# Patient Record
Sex: MALE | Race: BLACK OR AFRICAN AMERICAN | Employment: FULL TIME | URBAN - METROPOLITAN AREA
[De-identification: names, ages, dates, MRNs, and addresses within clinical notes are randomized per-mention and may not be internally consistent; named-entity substitution may affect disease eponyms.]

---

## 2024-06-15 ENCOUNTER — HOSPITAL ENCOUNTER (EMERGENCY)
Facility: HOSPITAL | Age: 19
Discharge: HOME | End: 2024-06-15
Attending: EMERGENCY MEDICINE
Payer: MEDICAID

## 2024-06-15 VITALS
SYSTOLIC BLOOD PRESSURE: 130 MMHG | HEIGHT: 63 IN | WEIGHT: 125 LBS | HEART RATE: 95 BPM | RESPIRATION RATE: 20 BRPM | TEMPERATURE: 100.2 F | OXYGEN SATURATION: 97 % | BODY MASS INDEX: 22.15 KG/M2 | DIASTOLIC BLOOD PRESSURE: 75 MMHG

## 2024-06-15 DIAGNOSIS — J45.901 SEVERE ASTHMA WITH ACUTE EXACERBATION, UNSPECIFIED WHETHER PERSISTENT: ICD-10-CM

## 2024-06-15 DIAGNOSIS — J18.9 ATYPICAL PNEUMONIA: ICD-10-CM

## 2024-06-15 DIAGNOSIS — J20.9 ACUTE BRONCHITIS, UNSPECIFIED ORGANISM: Primary | ICD-10-CM

## 2024-06-15 LAB
DEPRECATED S PYO AG THROAT QL EIA: NEGATIVE
FLUAV RNA SPEC QL NAA+PROBE: NOT DETECTED
FLUBV RNA SPEC QL NAA+PROBE: NOT DETECTED
SARS-COV-2 RNA RESP QL NAA+PROBE: NOT DETECTED

## 2024-06-15 PROCEDURE — 87880 STREP A ASSAY W/OPTIC: CPT

## 2024-06-15 PROCEDURE — 87636 SARSCOV2 & INF A&B AMP PRB: CPT

## 2024-06-15 PROCEDURE — 6370000000 HC RX 637 (ALT 250 FOR IP): Performed by: EMERGENCY MEDICINE

## 2024-06-15 PROCEDURE — 87070 CULTURE OTHR SPECIMN AEROBIC: CPT

## 2024-06-15 PROCEDURE — 99283 EMERGENCY DEPT VISIT LOW MDM: CPT

## 2024-06-15 RX ORDER — PREDNISONE 10 MG/1
TABLET ORAL
Qty: 1 EACH | Refills: 0 | Status: SHIPPED | OUTPATIENT
Start: 2024-06-15

## 2024-06-15 RX ORDER — CODEINE PHOSPHATE AND GUAIFENESIN 10; 100 MG/5ML; MG/5ML
10 SOLUTION ORAL
Status: COMPLETED | OUTPATIENT
Start: 2024-06-15 | End: 2024-06-15

## 2024-06-15 RX ORDER — PREDNISONE 20 MG/1
60 TABLET ORAL
Status: COMPLETED | OUTPATIENT
Start: 2024-06-15 | End: 2024-06-15

## 2024-06-15 RX ORDER — DOXYCYCLINE HYCLATE 100 MG
100 TABLET ORAL 2 TIMES DAILY
Qty: 20 TABLET | Refills: 0 | Status: SHIPPED | OUTPATIENT
Start: 2024-06-15 | End: 2024-06-25

## 2024-06-15 RX ORDER — IPRATROPIUM BROMIDE AND ALBUTEROL SULFATE 2.5; .5 MG/3ML; MG/3ML
1 SOLUTION RESPIRATORY (INHALATION) EVERY 4 HOURS PRN
Qty: 90 ML | Refills: 0 | Status: SHIPPED | OUTPATIENT
Start: 2024-06-15 | End: 2024-06-20

## 2024-06-15 RX ORDER — IBUPROFEN 600 MG/1
600 TABLET ORAL 4 TIMES DAILY PRN
Qty: 40 TABLET | Refills: 0 | Status: SHIPPED | OUTPATIENT
Start: 2024-06-15

## 2024-06-15 RX ORDER — ALBUTEROL SULFATE 90 UG/1
2 AEROSOL, METERED RESPIRATORY (INHALATION)
Status: COMPLETED | OUTPATIENT
Start: 2024-06-15 | End: 2024-06-15

## 2024-06-15 RX ORDER — BENZONATATE 200 MG/1
200 CAPSULE ORAL 3 TIMES DAILY PRN
Qty: 21 CAPSULE | Refills: 0 | Status: SHIPPED | OUTPATIENT
Start: 2024-06-15 | End: 2024-06-22

## 2024-06-15 RX ORDER — ACETAMINOPHEN 500 MG
1000 TABLET ORAL
Status: COMPLETED | OUTPATIENT
Start: 2024-06-15 | End: 2024-06-15

## 2024-06-15 RX ORDER — ALBUTEROL SULFATE 90 UG/1
2 AEROSOL, METERED RESPIRATORY (INHALATION) 4 TIMES DAILY PRN
Qty: 54 G | Refills: 2 | Status: SHIPPED | OUTPATIENT
Start: 2024-06-15

## 2024-06-15 RX ORDER — IBUPROFEN 400 MG/1
800 TABLET ORAL
Status: COMPLETED | OUTPATIENT
Start: 2024-06-15 | End: 2024-06-15

## 2024-06-15 RX ORDER — ACETAMINOPHEN 500 MG
1000 TABLET ORAL 3 TIMES DAILY PRN
Qty: 30 TABLET | Refills: 0 | Status: SHIPPED | OUTPATIENT
Start: 2024-06-15 | End: 2024-06-20

## 2024-06-15 RX ORDER — DOXYCYCLINE HYCLATE 100 MG
100 TABLET ORAL EVERY 12 HOURS SCHEDULED
Status: DISCONTINUED | OUTPATIENT
Start: 2024-06-15 | End: 2024-06-15 | Stop reason: HOSPADM

## 2024-06-15 RX ADMIN — ACETAMINOPHEN 1000 MG: 500 TABLET ORAL at 03:51

## 2024-06-15 RX ADMIN — ALBUTEROL SULFATE 2 PUFF: 90 AEROSOL, METERED RESPIRATORY (INHALATION) at 04:07

## 2024-06-15 RX ADMIN — GUAIFENESIN AND CODEINE PHOSPHATE 10 ML: 100; 10 SOLUTION ORAL at 04:07

## 2024-06-15 RX ADMIN — IBUPROFEN 800 MG: 400 TABLET, FILM COATED ORAL at 03:51

## 2024-06-15 RX ADMIN — PREDNISONE 60 MG: 20 TABLET ORAL at 04:07

## 2024-06-15 ASSESSMENT — ENCOUNTER SYMPTOMS
COLOR CHANGE: 0
VOMITING: 0
ABDOMINAL PAIN: 0
WHEEZING: 1
SHORTNESS OF BREATH: 1
TROUBLE SWALLOWING: 0
COUGH: 1
SORE THROAT: 0
DIARRHEA: 0
BACK PAIN: 0
PHOTOPHOBIA: 0
NAUSEA: 0

## 2024-06-15 ASSESSMENT — PAIN DESCRIPTION - DESCRIPTORS: DESCRIPTORS: ACHING

## 2024-06-15 ASSESSMENT — PAIN - FUNCTIONAL ASSESSMENT: PAIN_FUNCTIONAL_ASSESSMENT: 0-10

## 2024-06-15 ASSESSMENT — PAIN DESCRIPTION - LOCATION: LOCATION: GENERALIZED

## 2024-06-15 ASSESSMENT — PAIN SCALES - GENERAL: PAINLEVEL_OUTOF10: 10

## 2024-06-15 ASSESSMENT — LIFESTYLE VARIABLES
HOW OFTEN DO YOU HAVE A DRINK CONTAINING ALCOHOL: NEVER
HOW MANY STANDARD DRINKS CONTAINING ALCOHOL DO YOU HAVE ON A TYPICAL DAY: PATIENT DOES NOT DRINK

## 2024-06-15 NOTE — ED NOTES
Refer to triage note.   Pt is alert and oriented x 4, RR even and unlabored, skin  intact. Assessment completed and pt updated on plan of care.  Call bell in reach.       Emergency Department Nursing Plan of Care       The Nursing Plan of Care is developed from the Nursing assessment and Emergency Department Attending provider initial evaluation.  The plan of care may be reviewed in the “ED Provider note”.    The Plan of Care was developed with the following considerations:   Patient / Family readiness to learn indicated by:verbalized understanding  Persons(s) to be included in education: patient  Barriers to Learning/Limitations:No    Signed

## 2024-06-15 NOTE — ED NOTES
Discharge instructions were given to the patient by Perez.     The patient left the Emergency Department alert and oriented and in no acute distress with 7 prescriptions. The patient was encouraged to call or return to the ED for worsening issues or problems and was encouraged to schedule a follow up appointment for continuing care.     Ambulation assessment completed before discharge.  Pt left Emergency Department ambulating at baseline with no ortho devices  Ortho device education: none    The patient verbalized understanding of discharge instructions and prescriptions, all questions were answered. The patient has no further concerns at this time.

## 2024-06-15 NOTE — ED TRIAGE NOTES
Pt ambulatory to ED complaining of 3-4 days of productive cough, fevers, and sore throat. Seen at [facility] for same, given albuterol without relief. Hx of asthma, speaking in full sentences without distress.

## 2024-06-15 NOTE — DISCHARGE INSTRUCTIONS
It was a pleasure taking care of you in our Emergency Department today.  We know that when you come to [facility], you are entrusting us with your health, comfort, and safety.  Our physicians and nurses honor that trust, and truly appreciate the opportunity to care for you and your loved ones.    We also value your feedback.  If you receive a survey about your Emergency Department experience today, please fill it out.  We care about our patients' feedback, and we listen to what you have to say.  Thank you!      Dr. Gail Solis MD

## 2024-06-15 NOTE — ED PROVIDER NOTES
[facility] EMERGENCY DEPT  EMERGENCY DEPARTMENT ENCOUNTER         Pt Name: Jun Andujar  MRN: [id number]  Birthdate 2005  Date of evaluation: 6/15/2024  Provider: Gail Solis MD   PCP: No primary care provider on file.  Note Started: 5:20 AM 6/15/24     CHIEF COMPLAINT       Chief Complaint   Patient presents with    Cough    Fever        HISTORY OF PRESENT ILLNESS: 1 or more elements      History From: Patient and Patient's Mother  HPI Limitations: None     Jun Andujar is a 18 y.o. male who presents ***  History of asthma  For the past 5 days has had fever, chills, productive cough, pleurisy  Went to [facility] twice, was prescribed oral steroids, bronchodilators and mucinex.  Not helping and cough getting worse.  Dx was \"walking pna\"      Please see more comprehensive history below under MDM  Nursing Notes were all reviewed in real time as they are made available. Any disagreements addressed in the HPI/MDM.     REVIEW OF SYSTEMS      Review of Systems   Constitutional:  Positive for appetite change, chills and fever.   HENT:  Positive for congestion. Negative for ear pain, sore throat and trouble swallowing.    Eyes:  Negative for photophobia and visual disturbance.   Respiratory:  Positive for cough, shortness of breath and wheezing.    Cardiovascular:  Negative for chest pain, palpitations and leg swelling.   Gastrointestinal:  Negative for abdominal pain, diarrhea, nausea and vomiting.   Genitourinary:  Negative for dysuria, flank pain and hematuria.   Musculoskeletal:  Negative for arthralgias and back pain.   Skin:  Negative for color change and pallor.   Allergic/Immunologic: Negative for immunocompromised state.   Neurological:  Negative for syncope, light-headedness and headaches.   Psychiatric/Behavioral:  Negative for confusion.         Positives and Pertinent negatives as per HPI and MDM.    PAST HISTORY     Past Medical History:  No past medical history on file.    Past Surgical History:  No past surgical  times daily as needed for Pain or Fever     albuterol sulfate  (90 Base) MCG/ACT inhaler  Commonly known as: Ventolin HFA  Inhale 2 puffs into the lungs 4 times daily as needed for Wheezing     benzonatate 200 MG capsule  Commonly known as: TESSALON  Take 1 capsule by mouth 3 times daily as needed for Cough     doxycycline hyclate 100 MG tablet  Commonly known as: VIBRA-TABS  Take 1 tablet by mouth 2 times daily for 10 days     ibuprofen 600 MG tablet  Commonly known as: ADVIL;MOTRIN  Take 1 tablet by mouth 4 times daily as needed for Fever or Pain     ipratropium 0.5 mg-albuterol 2.5 mg 0.5-2.5 (3) MG/3ML Soln nebulizer solution  Commonly known as: DUONEB  Take 3 mLs by nebulization every 4 hours as needed for Shortness of Breath or Wheezing     predniSONE 10 MG (21) Tbpk  Use as directed               Where to Get Your Medications        These medications were sent to [facility]/pharmacy #[id number] - [place], [place] - [address] [phone] - F [phone]  [address] [address]      Hours: 24-hours Phone: [phone]   acetaminophen 500 MG tablet  albuterol sulfate  (90 Base) MCG/ACT inhaler  benzonatate 200 MG capsule  doxycycline hyclate 100 MG tablet  ibuprofen 600 MG tablet  ipratropium 0.5 mg-albuterol 2.5 mg 0.5-2.5 (3) MG/3ML Soln nebulizer solution  predniSONE 10 MG (21) Tbpk       2.   [facility] EMERGENCY DEPT  [address] [facility] [address] [phone]  Go to   As needed, If symptoms worsen    [facility]  [address] [id number] [phone]  Schedule an appointment as soon as possible for a visit       3.   Return to ED if worse       I am the Primary Clinician of Record.   Gail Solis MD (electronically signed)    (Please note that parts of this dictation were completed with voice recognition software. Quite often unanticipated grammatical, syntax, homophones, and other interpretive errors are inadvertently transcribed by the

## 2024-06-16 LAB
BACTERIA SPEC CULT: NORMAL
SERVICE CMNT-IMP: NORMAL

## 2024-06-17 LAB
BACTERIA SPEC CULT: NORMAL
SERVICE CMNT-IMP: NORMAL

## 2024-06-21 ENCOUNTER — APPOINTMENT (OUTPATIENT)
Facility: HOSPITAL | Age: 19
End: 2024-06-21
Payer: MEDICAID

## 2024-06-21 ENCOUNTER — HOSPITAL ENCOUNTER (INPATIENT)
Facility: HOSPITAL | Age: 19
LOS: 7 days | Discharge: HOME | End: 2024-06-28
Attending: INTERNAL MEDICINE | Admitting: FAMILY MEDICINE
Payer: MEDICAID

## 2024-06-21 ENCOUNTER — HOSPITAL ENCOUNTER (EMERGENCY)
Facility: HOSPITAL | Age: 19
Discharge: ANOTHER HOSPITAL | End: 2024-06-21
Payer: MEDICAID

## 2024-06-21 VITALS
DIASTOLIC BLOOD PRESSURE: 61 MMHG | RESPIRATION RATE: 16 BRPM | OXYGEN SATURATION: 98 % | WEIGHT: 114.42 LBS | TEMPERATURE: 98.3 F | HEART RATE: 100 BPM | SYSTOLIC BLOOD PRESSURE: 134 MMHG | BODY MASS INDEX: 20.27 KG/M2 | HEIGHT: 63 IN

## 2024-06-21 DIAGNOSIS — D72.829 LEUKOCYTOSIS, UNSPECIFIED TYPE: ICD-10-CM

## 2024-06-21 DIAGNOSIS — J18.9 PNEUMONIA OF LEFT LOWER LOBE DUE TO INFECTIOUS ORGANISM: Primary | ICD-10-CM

## 2024-06-21 DIAGNOSIS — J98.4 CAVITARY LESION OF LUNG: ICD-10-CM

## 2024-06-21 DIAGNOSIS — J85.1 ABSCESS OF LUNG WITH PNEUMONIA, UNSPECIFIED LATERALITY (HCC): Primary | ICD-10-CM

## 2024-06-21 LAB
ALBUMIN SERPL-MCNC: 3 G/DL (ref 3.5–5)
ALBUMIN/GLOB SERPL: 0.7 (ref 1.1–2.2)
ALP SERPL-CCNC: 62 U/L (ref 45–117)
ALT SERPL-CCNC: 50 U/L (ref 12–78)
ANION GAP SERPL CALC-SCNC: 8 MMOL/L (ref 5–15)
APPEARANCE UR: CLEAR
AST SERPL-CCNC: 25 U/L (ref 15–37)
BACTERIA URNS QL MICRO: NEGATIVE /HPF
BASOPHILS # BLD: 0.3 K/UL (ref 0–0.1)
BASOPHILS NFR BLD: 1 % (ref 0–1)
BILIRUB SERPL-MCNC: 0.6 MG/DL (ref 0.2–1)
BILIRUB UR QL: NEGATIVE
BUN SERPL-MCNC: 5 MG/DL (ref 6–20)
BUN/CREAT SERPL: 5 (ref 12–20)
CALCIUM SERPL-MCNC: 8.8 MG/DL (ref 8.5–10.1)
CHLORIDE SERPL-SCNC: 100 MMOL/L (ref 97–108)
CO2 SERPL-SCNC: 33 MMOL/L (ref 21–32)
COLOR UR: ABNORMAL
CREAT SERPL-MCNC: 1.02 MG/DL (ref 0.7–1.3)
DEPRECATED S PYO AG THROAT QL EIA: NEGATIVE
DIFFERENTIAL METHOD BLD: ABNORMAL
EOSINOPHIL # BLD: 0 K/UL (ref 0–0.4)
EOSINOPHIL NFR BLD: 0 % (ref 0–7)
EPITH CASTS URNS QL MICRO: ABNORMAL /LPF
ERYTHROCYTE [DISTWIDTH] IN BLOOD BY AUTOMATED COUNT: 12.6 % (ref 11.5–14.5)
GLOBULIN SER CALC-MCNC: 4.6 G/DL (ref 2–4)
GLUCOSE SERPL-MCNC: 67 MG/DL (ref 65–100)
GLUCOSE UR STRIP.AUTO-MCNC: 500 MG/DL
HCT VFR BLD AUTO: 47.2 % (ref 36.6–50.3)
HGB BLD-MCNC: 16.2 G/DL (ref 12.1–17)
HGB UR QL STRIP: NEGATIVE
IMM GRANULOCYTES # BLD AUTO: 0.3 K/UL (ref 0–0.04)
IMM GRANULOCYTES NFR BLD AUTO: 1 % (ref 0–0.5)
KETONES UR QL STRIP.AUTO: NEGATIVE MG/DL
LACTATE BLD-SCNC: 1.08 MMOL/L (ref 0.4–2)
LEUKOCYTE ESTERASE UR QL STRIP.AUTO: NEGATIVE
LYMPHOCYTES # BLD: 2.9 K/UL (ref 0.8–3.5)
LYMPHOCYTES NFR BLD: 11 % (ref 12–49)
MCH RBC QN AUTO: 31.4 PG (ref 26–34)
MCHC RBC AUTO-ENTMCNC: 34.3 G/DL (ref 30–36.5)
MCV RBC AUTO: 91.5 FL (ref 80–99)
MONOCYTES # BLD: 1.3 K/UL (ref 0–1)
MONOCYTES NFR BLD: 5 % (ref 5–13)
NEUTS SEG # BLD: 21.3 K/UL (ref 1.8–8)
NEUTS SEG NFR BLD: 82 % (ref 32–75)
NITRITE UR QL STRIP.AUTO: NEGATIVE
NRBC # BLD: 0 K/UL (ref 0–0.01)
NRBC BLD-RTO: 0 PER 100 WBC
PH UR STRIP: 7 (ref 5–8)
PLATELET # BLD AUTO: 369 K/UL (ref 150–400)
PMV BLD AUTO: 9.8 FL (ref 8.9–12.9)
POTASSIUM SERPL-SCNC: 4 MMOL/L (ref 3.5–5.1)
PROCALCITONIN SERPL-MCNC: 0.12 NG/ML
PROT SERPL-MCNC: 7.6 G/DL (ref 6.4–8.2)
PROT UR STRIP-MCNC: NEGATIVE MG/DL
RBC # BLD AUTO: 5.16 M/UL (ref 4.1–5.7)
RBC #/AREA URNS HPF: ABNORMAL /HPF (ref 0–5)
RBC MORPH BLD: ABNORMAL
SODIUM SERPL-SCNC: 141 MMOL/L (ref 136–145)
SP GR UR REFRACTOMETRY: 1.01
TROPONIN I SERPL HS-MCNC: 6 NG/L (ref 0–76)
URINE CULTURE IF INDICATED: ABNORMAL
UROBILINOGEN UR QL STRIP.AUTO: 1 EU/DL (ref 0.2–1)
WBC # BLD AUTO: 26.1 K/UL (ref 4.1–11.1)
WBC URNS QL MICRO: ABNORMAL /HPF (ref 0–4)

## 2024-06-21 PROCEDURE — 87040 BLOOD CULTURE FOR BACTERIA: CPT

## 2024-06-21 PROCEDURE — 80053 COMPREHEN METABOLIC PANEL: CPT

## 2024-06-21 PROCEDURE — 87880 STREP A ASSAY W/OPTIC: CPT

## 2024-06-21 PROCEDURE — 36415 COLL VENOUS BLD VENIPUNCTURE: CPT

## 2024-06-21 PROCEDURE — 6370000000 HC RX 637 (ALT 250 FOR IP): Performed by: FAMILY MEDICINE

## 2024-06-21 PROCEDURE — 87449 NOS EACH ORGANISM AG IA: CPT

## 2024-06-21 PROCEDURE — 87070 CULTURE OTHR SPECIMN AEROBIC: CPT

## 2024-06-21 PROCEDURE — 2580000003 HC RX 258: Performed by: EMERGENCY MEDICINE

## 2024-06-21 PROCEDURE — 83605 ASSAY OF LACTIC ACID: CPT

## 2024-06-21 PROCEDURE — 96365 THER/PROPH/DIAG IV INF INIT: CPT

## 2024-06-21 PROCEDURE — 85025 COMPLETE CBC W/AUTO DIFF WBC: CPT

## 2024-06-21 PROCEDURE — 96368 THER/DIAG CONCURRENT INF: CPT

## 2024-06-21 PROCEDURE — 2580000003 HC RX 258: Performed by: FAMILY MEDICINE

## 2024-06-21 PROCEDURE — 96366 THER/PROPH/DIAG IV INF ADDON: CPT

## 2024-06-21 PROCEDURE — 99285 EMERGENCY DEPT VISIT HI MDM: CPT

## 2024-06-21 PROCEDURE — 0202U NFCT DS 22 TRGT SARS-COV-2: CPT

## 2024-06-21 PROCEDURE — 2580000003 HC RX 258: Performed by: NURSE PRACTITIONER

## 2024-06-21 PROCEDURE — 6360000002 HC RX W HCPCS: Performed by: EMERGENCY MEDICINE

## 2024-06-21 PROCEDURE — 84145 PROCALCITONIN (PCT): CPT

## 2024-06-21 PROCEDURE — 84484 ASSAY OF TROPONIN QUANT: CPT

## 2024-06-21 PROCEDURE — 71045 X-RAY EXAM CHEST 1 VIEW: CPT

## 2024-06-21 PROCEDURE — 1100000000 HC RM PRIVATE

## 2024-06-21 PROCEDURE — 81001 URINALYSIS AUTO W/SCOPE: CPT

## 2024-06-21 RX ORDER — ACETAMINOPHEN 325 MG/1
650 TABLET ORAL EVERY 6 HOURS PRN
Status: DISCONTINUED | OUTPATIENT
Start: 2024-06-21 | End: 2024-06-28 | Stop reason: HOSPADM

## 2024-06-21 RX ORDER — BENZONATATE 100 MG/1
100 CAPSULE ORAL 3 TIMES DAILY PRN
Status: DISCONTINUED | OUTPATIENT
Start: 2024-06-21 | End: 2024-06-28 | Stop reason: HOSPADM

## 2024-06-21 RX ORDER — GUAIFENESIN 600 MG/1
600 TABLET, EXTENDED RELEASE ORAL 2 TIMES DAILY
Status: DISCONTINUED | OUTPATIENT
Start: 2024-06-21 | End: 2024-06-28 | Stop reason: HOSPADM

## 2024-06-21 RX ORDER — LEVOFLOXACIN 5 MG/ML
750 INJECTION, SOLUTION INTRAVENOUS ONCE
Status: COMPLETED | OUTPATIENT
Start: 2024-06-21 | End: 2024-06-21

## 2024-06-21 RX ORDER — ONDANSETRON 4 MG/1
4 TABLET, ORALLY DISINTEGRATING ORAL EVERY 8 HOURS PRN
Status: DISCONTINUED | OUTPATIENT
Start: 2024-06-21 | End: 2024-06-28 | Stop reason: HOSPADM

## 2024-06-21 RX ORDER — SODIUM CHLORIDE 0.9 % (FLUSH) 0.9 %
5-40 SYRINGE (ML) INJECTION EVERY 12 HOURS SCHEDULED
Status: DISCONTINUED | OUTPATIENT
Start: 2024-06-21 | End: 2024-06-28 | Stop reason: HOSPADM

## 2024-06-21 RX ORDER — SODIUM CHLORIDE 9 MG/ML
INJECTION, SOLUTION INTRAVENOUS PRN
Status: DISCONTINUED | OUTPATIENT
Start: 2024-06-21 | End: 2024-06-28 | Stop reason: HOSPADM

## 2024-06-21 RX ORDER — ENOXAPARIN SODIUM 100 MG/ML
40 INJECTION SUBCUTANEOUS DAILY
Status: DISCONTINUED | OUTPATIENT
Start: 2024-06-21 | End: 2024-06-28 | Stop reason: HOSPADM

## 2024-06-21 RX ORDER — ACETAMINOPHEN 650 MG/1
650 SUPPOSITORY RECTAL EVERY 6 HOURS PRN
Status: DISCONTINUED | OUTPATIENT
Start: 2024-06-21 | End: 2024-06-28 | Stop reason: HOSPADM

## 2024-06-21 RX ORDER — SODIUM CHLORIDE 0.9 % (FLUSH) 0.9 %
5-40 SYRINGE (ML) INJECTION PRN
Status: DISCONTINUED | OUTPATIENT
Start: 2024-06-21 | End: 2024-06-28 | Stop reason: HOSPADM

## 2024-06-21 RX ORDER — 0.9 % SODIUM CHLORIDE 0.9 %
30 INTRAVENOUS SOLUTION INTRAVENOUS ONCE
Status: COMPLETED | OUTPATIENT
Start: 2024-06-21 | End: 2024-06-21

## 2024-06-21 RX ORDER — SODIUM CHLORIDE 9 MG/ML
INJECTION, SOLUTION INTRAVENOUS CONTINUOUS
Status: DISPENSED | OUTPATIENT
Start: 2024-06-21 | End: 2024-06-23

## 2024-06-21 RX ORDER — ONDANSETRON 2 MG/ML
4 INJECTION INTRAMUSCULAR; INTRAVENOUS EVERY 6 HOURS PRN
Status: DISCONTINUED | OUTPATIENT
Start: 2024-06-21 | End: 2024-06-28 | Stop reason: HOSPADM

## 2024-06-21 RX ORDER — ALBUTEROL SULFATE 2.5 MG/3ML
2.5 SOLUTION RESPIRATORY (INHALATION) EVERY 6 HOURS PRN
Status: DISCONTINUED | OUTPATIENT
Start: 2024-06-21 | End: 2024-06-28 | Stop reason: HOSPADM

## 2024-06-21 RX ORDER — POLYETHYLENE GLYCOL 3350 17 G/17G
17 POWDER, FOR SOLUTION ORAL DAILY PRN
Status: DISCONTINUED | OUTPATIENT
Start: 2024-06-21 | End: 2024-06-28 | Stop reason: HOSPADM

## 2024-06-21 RX ORDER — 0.9 % SODIUM CHLORIDE 0.9 %
1000 INTRAVENOUS SOLUTION INTRAVENOUS ONCE
Status: COMPLETED | OUTPATIENT
Start: 2024-06-21 | End: 2024-06-21

## 2024-06-21 RX ADMIN — PIPERACILLIN AND TAZOBACTAM 3375 MG: 3; .375 INJECTION, POWDER, LYOPHILIZED, FOR SOLUTION INTRAVENOUS at 17:51

## 2024-06-21 RX ADMIN — SODIUM CHLORIDE 1557 ML: 9 INJECTION, SOLUTION INTRAVENOUS at 16:18

## 2024-06-21 RX ADMIN — LEVOFLOXACIN 750 MG: 5 INJECTION, SOLUTION INTRAVENOUS at 15:07

## 2024-06-21 RX ADMIN — GUAIFENESIN 600 MG: 600 TABLET, EXTENDED RELEASE ORAL at 22:50

## 2024-06-21 RX ADMIN — SODIUM CHLORIDE 1000 ML: 9 INJECTION, SOLUTION INTRAVENOUS at 14:14

## 2024-06-21 RX ADMIN — SODIUM CHLORIDE, PRESERVATIVE FREE 10 ML: 5 INJECTION INTRAVENOUS at 22:50

## 2024-06-21 RX ADMIN — CEFTRIAXONE SODIUM 1000 MG: 1 INJECTION, POWDER, FOR SOLUTION INTRAMUSCULAR; INTRAVENOUS at 15:07

## 2024-06-21 ASSESSMENT — PAIN SCALES - GENERAL: PAINLEVEL_OUTOF10: 10

## 2024-06-21 ASSESSMENT — PAIN DESCRIPTION - LOCATION: LOCATION: THROAT

## 2024-06-21 ASSESSMENT — PAIN DESCRIPTION - DESCRIPTORS: DESCRIPTORS: SORE

## 2024-06-21 ASSESSMENT — ENCOUNTER SYMPTOMS
COUGH: 1
SHORTNESS OF BREATH: 0
ABDOMINAL PAIN: 0
BACK PAIN: 0

## 2024-06-21 ASSESSMENT — PAIN - FUNCTIONAL ASSESSMENT: PAIN_FUNCTIONAL_ASSESSMENT: 0-10

## 2024-06-21 NOTE — ED PROVIDER NOTES
[facility] EMERGENCY DEPT  EMERGENCY DEPARTMENT [facility] ER       Pt Name: Jun Andujar  MRN: [id number]  Birthdate 2005  Date of evaluation: 6/21/2024  Provider: EL Multani NP   PCP: No primary care provider on file.  Note Started: 4:43 PM 6/21/24     CHIEF COMPLAINT       Chief Complaint   Patient presents with    Cough    Pharyngitis        HISTORY OF PRESENT ILLNESS: 1 or more elements      History Provided by: Patient   History is limited by: Nothing     Jun Andujar is a 18 y.o. male who presents cc sore throat and cough for 2 weeks .  States he has taken multiple medications but he is not getting better.  States he has had a fever and chills.  States he has had a cough states his throat is still sore.  He denies chest pain wheezing or shortness of breath he reports a history of asthma he denies abdominal pain nausea vomiting diarrhea headache numbness or tingling.  Nursing Notes were all reviewed and agreed with or any disagreements were addressed in the HPI.     REVIEW OF SYSTEMS      Review of Systems   Constitutional:  Positive for chills and fever.   HENT:  Negative for congestion.    Eyes:  Negative for visual disturbance.   Respiratory:  Positive for cough. Negative for shortness of breath.    Cardiovascular:  Negative for chest pain.   Gastrointestinal:  Negative for abdominal pain.   Musculoskeletal:  Negative for back pain and neck pain.   Skin:  Negative for rash.   Neurological:  Negative for dizziness, weakness and headaches.   Psychiatric/Behavioral:  Negative for behavioral problems.    All other systems reviewed and are negative.       Positives and Pertinent negatives as per HPI.    PAST HISTORY     Past Medical History:  No past medical history on file.    Past Surgical History:  No past surgical history on file.    Family History:  No family history on file.    Social History:       Allergies:  No Known Allergies    CURRENT MEDICATIONS      Previous Medications

## 2024-06-21 NOTE — ED NOTES
Patient taken by EMS via ambulance stretcher for transfer to [facility] at this time.

## 2024-06-21 NOTE — H&P
History and Physical    Date of Service:  6/21/2024  Primary Care Provider: No primary care provider on file.  Source of information: The patient and Chart review    Chief Complaint: Cough      History of Presenting Illness:   Jun Andujar is a 18 y.o. male with past medical history of asthma presented as a direct admission/transfer from [facility] ED to [facility] with chief complaints of sore throat and cough.  Symptoms onset reported again about 2 weeks ago with cough productive of white sputum, and sore throat which is remain constant, now severe, without specific exacerbating leaving factors.  Patient was seen twice at [facility], had been prescribed steroids, inhalers, and Mucinex without relief of symptoms.  Patient then came to [facility] ED on 6/15/2024, had a rapid strep screen which was negative was discharged home.  In the interim of these ER visits, patient has been taking doxycycline, azithromycin, prednisone, ibuprofen, and albuterol still with no relief of symptoms.  Later ER note some intermittent history of fever and chills but not apparent.  Patient feels occasional fullness in his left chest.  Patient then returned to [facility] ED today.  Abnormal labs included WBC 26.1, neutrophils 82%, serum CO2 33, albumin 3.0.  Urinalysis showed glucose 500.  Chest xray portable showed findings most likely due to severe infectious or inflammatory process with large area of consolidation left mid and lower lungs with several areas of lucency suggesting developing lung abscesses or necrosis within pneumonia with ill-defined 4 cm cavitary nodule in right upper lobe.  ED ordered 0.9% NS 1000 mL + 1557 ml IV boluses, ceftriaxone 1000 mg IV, levofloxacin 750 mg IV, and Zosyn 3375 mg IV x 1 dose.  ED consulted pulmonologist.  ED requested transfer to [facility], as medicine service noted patient needed higher level of care.

## 2024-06-21 NOTE — ED TRIAGE NOTES
Pt presents with sore throat and productive cough with white sputum x 2 weeks. Pt has taken azithromycin, doxycycline, prednisone, ibuprofen, and albuterol with no relief. Pt also endorses chills.

## 2024-06-21 NOTE — ED NOTES
TRANSFER - OUT REPORT:    TELEPHONEVerbal report given to RICCARDO MOSCOSO RN on Jun Andujar  being transferred to [facility] ROOM 601 for routine progression of patient care       Report consisted of patient's Situation, Background, Assessment and   Recommendations(SBAR).     Information from the following report(s) Nurse Handoff Report, ED SBAR, and Recent Results was reviewed with the receiving nurse.    [place] Fall Assessment:    Presents to emergency department  because of falls (Syncope, seizure, or loss of consciousness): No  Age > 70: No  Altered Mental Status, Intoxication with alcohol or substance confusion (Disorientation, impaired judgment, poor safety awaremess, or inability to follow instructions): No  Impaired Mobility: Ambulates or transfers with assistive devices or assistance; Unable to ambulate or transer.: No  Nursing Judgement: No          Lines:   Peripheral IV 06/21/24 Left Antecubital (Active)       Peripheral IV 06/21/24 Right;Upper Forearm (Active)        Opportunity for questions and clarification was provided.      Patient transported with:  EMS

## 2024-06-22 LAB
ALBUMIN SERPL-MCNC: 2.4 G/DL (ref 3.5–5)
ALBUMIN/GLOB SERPL: 0.5 (ref 1.1–2.2)
ALP SERPL-CCNC: 57 U/L (ref 45–117)
ALT SERPL-CCNC: 39 U/L (ref 12–78)
ANION GAP SERPL CALC-SCNC: 4 MMOL/L (ref 5–15)
APTT PPP: 26.8 SEC (ref 22.1–31)
AST SERPL-CCNC: 14 U/L (ref 15–37)
B PERT DNA SPEC QL NAA+PROBE: NOT DETECTED
BASOPHILS # BLD: 0.1 K/UL (ref 0–0.1)
BASOPHILS NFR BLD: 0 % (ref 0–1)
BILIRUB SERPL-MCNC: 0.8 MG/DL (ref 0.2–1)
BORDETELLA PARAPERTUSSIS BY PCR: NOT DETECTED
BUN SERPL-MCNC: 6 MG/DL (ref 6–20)
BUN/CREAT SERPL: 7 (ref 12–20)
C PNEUM DNA SPEC QL NAA+PROBE: NOT DETECTED
CALCIUM SERPL-MCNC: 8.9 MG/DL (ref 8.5–10.1)
CHLORIDE SERPL-SCNC: 107 MMOL/L (ref 97–108)
CO2 SERPL-SCNC: 24 MMOL/L (ref 21–32)
CREAT SERPL-MCNC: 0.9 MG/DL (ref 0.7–1.3)
DIFFERENTIAL METHOD BLD: ABNORMAL
EOSINOPHIL # BLD: 0.1 K/UL (ref 0–0.4)
EOSINOPHIL NFR BLD: 1 % (ref 0–7)
ERYTHROCYTE [DISTWIDTH] IN BLOOD BY AUTOMATED COUNT: 12.7 % (ref 11.5–14.5)
FLUAV SUBTYP SPEC NAA+PROBE: NOT DETECTED
FLUBV RNA SPEC QL NAA+PROBE: NOT DETECTED
GLOBULIN SER CALC-MCNC: 4.4 G/DL (ref 2–4)
GLUCOSE SERPL-MCNC: 124 MG/DL (ref 65–100)
HADV DNA SPEC QL NAA+PROBE: NOT DETECTED
HCOV 229E RNA SPEC QL NAA+PROBE: NOT DETECTED
HCOV HKU1 RNA SPEC QL NAA+PROBE: NOT DETECTED
HCOV NL63 RNA SPEC QL NAA+PROBE: NOT DETECTED
HCOV OC43 RNA SPEC QL NAA+PROBE: NOT DETECTED
HCT VFR BLD AUTO: 44.9 % (ref 36.6–50.3)
HGB BLD-MCNC: 15.5 G/DL (ref 12.1–17)
HMPV RNA SPEC QL NAA+PROBE: NOT DETECTED
HPIV1 RNA SPEC QL NAA+PROBE: NOT DETECTED
HPIV2 RNA SPEC QL NAA+PROBE: NOT DETECTED
HPIV3 RNA SPEC QL NAA+PROBE: NOT DETECTED
HPIV4 RNA SPEC QL NAA+PROBE: NOT DETECTED
IMM GRANULOCYTES # BLD AUTO: 0.1 K/UL (ref 0–0.04)
IMM GRANULOCYTES NFR BLD AUTO: 1 % (ref 0–0.5)
INR PPP: 1.3 (ref 0.9–1.1)
LYMPHOCYTES # BLD: 3.2 K/UL (ref 0.8–3.5)
LYMPHOCYTES NFR BLD: 14 % (ref 12–49)
M PNEUMO DNA SPEC QL NAA+PROBE: NOT DETECTED
MCH RBC QN AUTO: 31.5 PG (ref 26–34)
MCHC RBC AUTO-ENTMCNC: 34.5 G/DL (ref 30–36.5)
MCV RBC AUTO: 91.3 FL (ref 80–99)
MONOCYTES # BLD: 1.4 K/UL (ref 0–1)
MONOCYTES NFR BLD: 6 % (ref 5–13)
NEUTS SEG # BLD: 17.6 K/UL (ref 1.8–8)
NEUTS SEG NFR BLD: 78 % (ref 32–75)
NRBC # BLD: 0 K/UL (ref 0–0.01)
NRBC BLD-RTO: 0 PER 100 WBC
PLATELET # BLD AUTO: 350 K/UL (ref 150–400)
PMV BLD AUTO: 9.7 FL (ref 8.9–12.9)
POTASSIUM SERPL-SCNC: 3.9 MMOL/L (ref 3.5–5.1)
PROCALCITONIN SERPL-MCNC: 0.15 NG/ML
PROT SERPL-MCNC: 6.8 G/DL (ref 6.4–8.2)
PROTHROMBIN TIME: 13.2 SEC (ref 9–11.1)
RBC # BLD AUTO: 4.92 M/UL (ref 4.1–5.7)
RSV RNA SPEC QL NAA+PROBE: NOT DETECTED
RV+EV RNA SPEC QL NAA+PROBE: NOT DETECTED
SARS-COV-2 RNA RESP QL NAA+PROBE: NOT DETECTED
SODIUM SERPL-SCNC: 135 MMOL/L (ref 136–145)
THERAPEUTIC RANGE: NORMAL SECS (ref 58–77)
WBC # BLD AUTO: 22.6 K/UL (ref 4.1–11.1)

## 2024-06-22 PROCEDURE — 1100000000 HC RM PRIVATE

## 2024-06-22 PROCEDURE — 84145 PROCALCITONIN (PCT): CPT

## 2024-06-22 PROCEDURE — 36415 COLL VENOUS BLD VENIPUNCTURE: CPT

## 2024-06-22 PROCEDURE — 80053 COMPREHEN METABOLIC PANEL: CPT

## 2024-06-22 PROCEDURE — 85610 PROTHROMBIN TIME: CPT

## 2024-06-22 PROCEDURE — 85025 COMPLETE CBC W/AUTO DIFF WBC: CPT

## 2024-06-22 PROCEDURE — 2580000003 HC RX 258: Performed by: FAMILY MEDICINE

## 2024-06-22 PROCEDURE — 85730 THROMBOPLASTIN TIME PARTIAL: CPT

## 2024-06-22 PROCEDURE — 6360000002 HC RX W HCPCS: Performed by: FAMILY MEDICINE

## 2024-06-22 PROCEDURE — 94760 N-INVAS EAR/PLS OXIMETRY 1: CPT

## 2024-06-22 PROCEDURE — 6370000000 HC RX 637 (ALT 250 FOR IP): Performed by: FAMILY MEDICINE

## 2024-06-22 RX ADMIN — PIPERACILLIN AND TAZOBACTAM 3375 MG: 3; .375 INJECTION, POWDER, LYOPHILIZED, FOR SOLUTION INTRAVENOUS at 00:30

## 2024-06-22 RX ADMIN — GUAIFENESIN 600 MG: 600 TABLET, EXTENDED RELEASE ORAL at 08:37

## 2024-06-22 RX ADMIN — GUAIFENESIN 600 MG: 600 TABLET, EXTENDED RELEASE ORAL at 20:53

## 2024-06-22 RX ADMIN — SODIUM CHLORIDE: 9 INJECTION, SOLUTION INTRAVENOUS at 00:43

## 2024-06-22 RX ADMIN — ACETAMINOPHEN 650 MG: 325 TABLET ORAL at 21:04

## 2024-06-22 RX ADMIN — SODIUM CHLORIDE, PRESERVATIVE FREE 10 ML: 5 INJECTION INTRAVENOUS at 20:53

## 2024-06-22 RX ADMIN — PIPERACILLIN AND TAZOBACTAM 3375 MG: 3; .375 INJECTION, POWDER, LYOPHILIZED, FOR SOLUTION INTRAVENOUS at 08:37

## 2024-06-22 RX ADMIN — SODIUM CHLORIDE, PRESERVATIVE FREE 10 ML: 5 INJECTION INTRAVENOUS at 08:39

## 2024-06-22 RX ADMIN — PIPERACILLIN AND TAZOBACTAM 3375 MG: 3; .375 INJECTION, POWDER, LYOPHILIZED, FOR SOLUTION INTRAVENOUS at 15:48

## 2024-06-22 ASSESSMENT — PAIN SCALES - GENERAL: PAINLEVEL_OUTOF10: 0

## 2024-06-22 NOTE — PROGRESS NOTES
Hospitalist Progress Note  Jeremy Alexander MD  Answering service: [phone]        Date of Service:  2024  NAME:  Jun Andujar  :  2005  MRN:  [id number]      Admission Summary:     Patient transferred from [facility] with pneumonia and lung abscess.    Interval history / Subjective:     Patient denies any chest pain or shortness of breath.     Assessment & Plan:     Sepsis  -Patient presented with tachycardia and leukocytosis  -Sepsis due to pneumonia with lung abscess  -Sepsis reassessment completed, follow cultures, monitor hemodynamics, manage underlying etiology    Lung abscess  -Chest x-ray shows severe infectious or inflammatory process with large area of consolidation left mid and lower lungs with several areas of lucency suggesting developing lung abscesses or necrosis with pneumonia and cavitary nodule in the right upper lobe  -CT chest pending, QuantiFERON gold pending, blood cultures so far no growth, respiratory viral panel negative, Legionella negative  -Continue Zosyn, pulmonology has been consulted    Asthma  -Stable, breathing treatment.    Substance use disorder  -Patient noted to use marijuana  -Counseling done     Code status: Full  Prophylaxis: Lovenox  Care Plan discussed with: Patient  Anticipated Disposition: 24 to 48 hours  Central Line:   None             Review of Systems:   Pertinent items are noted in HPI.         Vital Signs:    Last 24hrs VS reviewed since prior progress note. Most recent are:  Vitals:    24 0615   BP: 139/74   Pulse: 96   Resp: 20   Temp: 100.2 °F (37.9 °C)   SpO2: 97%       No intake or output data in the 24 hours ending 24 0822     Physical Examination:     I had a face to face encounter with this patient and independently examined them on 2024 as outlined below:          General : alert x 3, awake, no acute distress,   HEENT: PEERL, EOMI, moist mucus

## 2024-06-22 NOTE — PLAN OF CARE
Problem: Respiratory - Adult  Goal: Effective breathing pattern  6/22/2024 1256 by Genoveva Malave RN  Outcome: Progressing  6/22/2024 1147 by Genoveva Malave RN  Outcome: Progressing     Problem: Infection - Adult  Goal: Able to breathe comfortably  Description: Able to breathe comfortably  6/22/2024 1256 by Genoveva Malave RN  Outcome: Progressing  6/22/2024 1147 by Genoveva Malave RN  Outcome: Progressing  Goal: *Absence of infection signs and symptoms  6/22/2024 1256 by Genoveva Malave RN  Outcome: Progressing  6/22/2024 1147 by Genoveva Malave RN  Outcome: Progressing  Goal: Effective breathing pattern  Description: Effective breathing pattern  6/22/2024 1256 by Genoveva Malave RN  Outcome: Progressing  6/22/2024 1147 by Genoveva Malave RN  Outcome: Progressing

## 2024-06-23 LAB
ANION GAP SERPL CALC-SCNC: 4 MMOL/L (ref 5–15)
BACTERIA SPEC CULT: NORMAL
BASOPHILS # BLD: 0.1 K/UL (ref 0–0.1)
BASOPHILS NFR BLD: 0 % (ref 0–1)
BUN SERPL-MCNC: 11 MG/DL (ref 6–20)
BUN/CREAT SERPL: 10 (ref 12–20)
CALCIUM SERPL-MCNC: 8.7 MG/DL (ref 8.5–10.1)
CHLORIDE SERPL-SCNC: 105 MMOL/L (ref 97–108)
CO2 SERPL-SCNC: 26 MMOL/L (ref 21–32)
CREAT SERPL-MCNC: 1.15 MG/DL (ref 0.7–1.3)
DIFFERENTIAL METHOD BLD: ABNORMAL
EOSINOPHIL # BLD: 0.3 K/UL (ref 0–0.4)
EOSINOPHIL NFR BLD: 1 % (ref 0–7)
ERYTHROCYTE [DISTWIDTH] IN BLOOD BY AUTOMATED COUNT: 12.6 % (ref 11.5–14.5)
GLUCOSE SERPL-MCNC: 211 MG/DL (ref 65–100)
HCT VFR BLD AUTO: 44.1 % (ref 36.6–50.3)
HGB BLD-MCNC: 15.4 G/DL (ref 12.1–17)
IMM GRANULOCYTES # BLD AUTO: 0.1 K/UL (ref 0–0.04)
IMM GRANULOCYTES NFR BLD AUTO: 1 % (ref 0–0.5)
LYMPHOCYTES # BLD: 2.5 K/UL (ref 0.8–3.5)
LYMPHOCYTES NFR BLD: 14 % (ref 12–49)
MCH RBC QN AUTO: 32.1 PG (ref 26–34)
MCHC RBC AUTO-ENTMCNC: 34.9 G/DL (ref 30–36.5)
MCV RBC AUTO: 91.9 FL (ref 80–99)
MONOCYTES # BLD: 0.8 K/UL (ref 0–1)
MONOCYTES NFR BLD: 5 % (ref 5–13)
NEUTS SEG # BLD: 13.7 K/UL (ref 1.8–8)
NEUTS SEG NFR BLD: 79 % (ref 32–75)
NRBC # BLD: 0 K/UL (ref 0–0.01)
NRBC BLD-RTO: 0 PER 100 WBC
PLATELET # BLD AUTO: 341 K/UL (ref 150–400)
PMV BLD AUTO: 9.5 FL (ref 8.9–12.9)
POTASSIUM SERPL-SCNC: 3.4 MMOL/L (ref 3.5–5.1)
RBC # BLD AUTO: 4.8 M/UL (ref 4.1–5.7)
SERVICE CMNT-IMP: NORMAL
SODIUM SERPL-SCNC: 135 MMOL/L (ref 136–145)
WBC # BLD AUTO: 17.4 K/UL (ref 4.1–11.1)

## 2024-06-23 PROCEDURE — 6360000002 HC RX W HCPCS: Performed by: FAMILY MEDICINE

## 2024-06-23 PROCEDURE — 80048 BASIC METABOLIC PNL TOTAL CA: CPT

## 2024-06-23 PROCEDURE — 87070 CULTURE OTHR SPECIMN AEROBIC: CPT

## 2024-06-23 PROCEDURE — 87116 MYCOBACTERIA CULTURE: CPT

## 2024-06-23 PROCEDURE — 94760 N-INVAS EAR/PLS OXIMETRY 1: CPT

## 2024-06-23 PROCEDURE — 87205 SMEAR GRAM STAIN: CPT

## 2024-06-23 PROCEDURE — 36415 COLL VENOUS BLD VENIPUNCTURE: CPT

## 2024-06-23 PROCEDURE — 1100000000 HC RM PRIVATE

## 2024-06-23 PROCEDURE — 86480 TB TEST CELL IMMUN MEASURE: CPT

## 2024-06-23 PROCEDURE — 87206 SMEAR FLUORESCENT/ACID STAI: CPT

## 2024-06-23 PROCEDURE — 2580000003 HC RX 258: Performed by: FAMILY MEDICINE

## 2024-06-23 PROCEDURE — 85025 COMPLETE CBC W/AUTO DIFF WBC: CPT

## 2024-06-23 PROCEDURE — 6370000000 HC RX 637 (ALT 250 FOR IP): Performed by: FAMILY MEDICINE

## 2024-06-23 RX ADMIN — SODIUM CHLORIDE, PRESERVATIVE FREE 10 ML: 5 INJECTION INTRAVENOUS at 08:42

## 2024-06-23 RX ADMIN — PIPERACILLIN AND TAZOBACTAM 3375 MG: 3; .375 INJECTION, POWDER, LYOPHILIZED, FOR SOLUTION INTRAVENOUS at 00:24

## 2024-06-23 RX ADMIN — GUAIFENESIN 600 MG: 600 TABLET, EXTENDED RELEASE ORAL at 22:25

## 2024-06-23 RX ADMIN — PIPERACILLIN AND TAZOBACTAM 3375 MG: 3; .375 INJECTION, POWDER, LYOPHILIZED, FOR SOLUTION INTRAVENOUS at 16:03

## 2024-06-23 RX ADMIN — GUAIFENESIN 600 MG: 600 TABLET, EXTENDED RELEASE ORAL at 08:42

## 2024-06-23 RX ADMIN — PIPERACILLIN AND TAZOBACTAM 3375 MG: 3; .375 INJECTION, POWDER, LYOPHILIZED, FOR SOLUTION INTRAVENOUS at 07:26

## 2024-06-23 RX ADMIN — SODIUM CHLORIDE, PRESERVATIVE FREE 10 ML: 5 INJECTION INTRAVENOUS at 22:26

## 2024-06-23 ASSESSMENT — PAIN SCALES - GENERAL: PAINLEVEL_OUTOF10: 0

## 2024-06-23 NOTE — PROGRESS NOTES
Hospitalist Progress Note  Jeremy Alexander MD  Answering service: [phone]        Date of Service:  2024  NAME:  Jun Andujar  :  2005  MRN:  [id number]      Admission Summary:     Patient transferred from [facility] with pneumonia and lung abscess.    Interval history / Subjective:     Patient denies any chest pain or shortness of breath.     Assessment & Plan:     Sepsis  -Patient presented with tachycardia and leukocytosis  -Sepsis due to pneumonia with lung abscess  -Sepsis reassessment completed, follow cultures, monitor hemodynamics, manage underlying etiology    Lung abscess  -Chest x-ray shows severe infectious or inflammatory process with large area of consolidation left mid and lower lungs with several areas of lucency suggesting developing lung abscesses or necrosis with pneumonia and cavitary nodule in the right upper lobe  -CT chest pending, QuantiFERON gold pending, blood cultures so far no growth, respiratory viral panel negative, Legionella negative  -Continue Zosyn, pulmonology has been consulted    Asthma  -Stable, breathing treatment.    Substance use disorder  -Patient noted to use marijuana  -Counseling done     Code status: Full  Prophylaxis: Lovenox  Care Plan discussed with: Patient  Anticipated Disposition: 24 to 48 hours  Central Line:   None             Review of Systems:   Pertinent items are noted in HPI.         Vital Signs:    Last 24hrs VS reviewed since prior progress note. Most recent are:  Vitals:    24 1000   BP:    Pulse: (!) 107   Resp:    Temp:    SpO2:          Intake/Output Summary (Last 24 hours) at 2024 1150  Last data filed at 2024 0726  Gross per 24 hour   Intake 10 ml   Output 1900 ml   Net -1890 ml        Physical Examination:     I had a face to face encounter with this patient and independently examined them on 2024 as outlined

## 2024-06-23 NOTE — PLAN OF CARE
Problem: Respiratory - Adult  Goal: Effective breathing pattern  6/23/2024 1143 by Genoveva Malave RN  Outcome: Progressing  6/23/2024 0055 by Zahraa Medina LPN  Outcome: Progressing     Problem: Infection - Adult  Goal: Effective breathing pattern  Description: Effective breathing pattern  Outcome: Progressing

## 2024-06-23 NOTE — CONSULTS
Pulmonary, Critical Care, and Sleep Medicine      Name: Jun Andujar MRN: [id number]   : 2005 Hospital: [facility]   Date: 2024  Admission date: 2024 Hospital Day: 3   Patient PCP: No primary care provider on file.    History:   Pt is acutely ill . Medical records and data reviewed. Pt seen in consultation    IMPRESSION:   Cavitary lung disease-pneumonia with lung abscesses versus septic emboli versus underlying malignancy; procalcitonin 0.15  Leukocytosis  Persistent cough  Asthma  Marijuana use disorder  hypoalbuminemia  Body mass index is 23.67 kg/m².      RECOMMENDATIONS/PLAN:   Agree with orders  Discussed with nursing.  QuantiFERON never sent from [facility].  Just sent this morning.  AFB sputum x 3  Follow-up routine sputum culture  Paired blood cultures; if positive will need echo  If he does not improve the patient will need chest CT scan for better view  Consider HIV testing  Bronchial hygiene with respiratory therapy techniques, Incentive spirometer; flutter device  Adjust bronchodilators  DVT prophylaxis  Smoking cessation counseling  Prescription drug management with home med reconciliation reviewed  Thanks you for asking us to see pt while in the hospital     Interval history:         [x] High complexity decision making was performed  [x] See my orders for details      Initial HPI:      I was asked by Alex Morrow MD to see Jun Andujar  a 18 y.o.   male in consultation for a chief complaint of abnormal chest x-ray    Excerpts from admission 2024 or consult notes as follows:     \" Jun Andujar is a 18 y.o. male with past medical history of asthma presented as a direct admission/transfer from [facility] ED to [facility] with chief complaints of sore throat and cough.  Symptoms onset reported again about 2 weeks ago with cough productive of white sputum, and sore throat which is remain constant, now severe, without specific  reports, and pulse oximetry data.        Please note that this dictation was completed with [employer], the computer voice recognition software.  Quite often unanticipated grammatical, syntax, homophones, and other interpretive errors are inadvertently transcribed by the computer software.  Please disregard these errors.  Please excuse any errors that have escaped final proofreading  ______________________________________________________________________      Thank you for allowing us to participate in the care of this patient.      This care involved high complexity medical decision making: I personally:  Reviewed the flowsheet and previous days notes  Reviewed and summarized records or history from previous days note or discussions with staff, family  High Risk Drug therapy requiring intensive monitoring for toxicity: eg steroids, antibiotics  Reviewed and/or ordered Clinical lab tests  Reviewed images and/or ordered Radiology tests  discussed my assessment/management with : Nursing, Family for coordination of care      Aaron Rhodes MD

## 2024-06-24 LAB
BACTERIA SPEC CULT: NORMAL
BACTERIA SPEC CULT: NORMAL
L PNEUMO1 AG UR QL IA: NEGATIVE
SERVICE CMNT-IMP: NORMAL
SPECIMEN SOURCE: NORMAL

## 2024-06-24 PROCEDURE — 87206 SMEAR FLUORESCENT/ACID STAI: CPT

## 2024-06-24 PROCEDURE — 1100000000 HC RM PRIVATE

## 2024-06-24 PROCEDURE — 87116 MYCOBACTERIA CULTURE: CPT

## 2024-06-24 PROCEDURE — 6360000002 HC RX W HCPCS: Performed by: FAMILY MEDICINE

## 2024-06-24 PROCEDURE — 2580000003 HC RX 258: Performed by: FAMILY MEDICINE

## 2024-06-24 PROCEDURE — 6370000000 HC RX 637 (ALT 250 FOR IP): Performed by: FAMILY MEDICINE

## 2024-06-24 RX ADMIN — GUAIFENESIN 600 MG: 600 TABLET, EXTENDED RELEASE ORAL at 20:26

## 2024-06-24 RX ADMIN — PIPERACILLIN AND TAZOBACTAM 3375 MG: 3; .375 INJECTION, POWDER, LYOPHILIZED, FOR SOLUTION INTRAVENOUS at 08:06

## 2024-06-24 RX ADMIN — PIPERACILLIN AND TAZOBACTAM 3375 MG: 3; .375 INJECTION, POWDER, LYOPHILIZED, FOR SOLUTION INTRAVENOUS at 17:04

## 2024-06-24 RX ADMIN — GUAIFENESIN 600 MG: 600 TABLET, EXTENDED RELEASE ORAL at 11:34

## 2024-06-24 RX ADMIN — PIPERACILLIN AND TAZOBACTAM 3375 MG: 3; .375 INJECTION, POWDER, LYOPHILIZED, FOR SOLUTION INTRAVENOUS at 23:45

## 2024-06-24 RX ADMIN — PIPERACILLIN AND TAZOBACTAM 3375 MG: 3; .375 INJECTION, POWDER, LYOPHILIZED, FOR SOLUTION INTRAVENOUS at 00:01

## 2024-06-24 ASSESSMENT — PAIN SCALES - GENERAL: PAINLEVEL_OUTOF10: 0

## 2024-06-24 NOTE — PROGRESS NOTES
PCCM:    Fever curve up, VSS    AFBs pending    Plan:    Abnormal chest x-ray    Pending ruled out for TB   On zosycr Rodriguez PA-C

## 2024-06-24 NOTE — PROGRESS NOTES
Hospitalist Progress Note  Jeremy Alexander MD  Answering service: [phone]        Date of Service:  2024  NAME:  Jun Andujar  :  2005  MRN:  [id number]      Admission Summary:     Patient transferred from [facility] with pneumonia and lung abscess.    Interval history / Subjective:     Patient denies any chest pain or shortness of breath.     Assessment & Plan:     Sepsis  -Patient presented with tachycardia and leukocytosis  -Sepsis due to pneumonia with lung abscess  -Sepsis reassessment completed, follow cultures, monitor hemodynamics, manage underlying etiology    Lung abscess  -Chest x-ray shows severe infectious or inflammatory process with large area of consolidation left mid and lower lungs with several areas of lucency suggesting developing lung abscesses or necrosis with pneumonia and cavitary nodule in the right upper lobe  -CT chest pending, QuantiFERON gold pending, blood cultures so far no growth, respiratory viral panel negative, Legionella negative  -Continue Zosyn, pulmonology following    Asthma  -Stable, breathing treatment.    Substance use disorder  -Patient noted to use marijuana  -Counseling done     Code status: Full  Prophylaxis: Lovenox  Care Plan discussed with: Patient  Anticipated Disposition: 24 to 48 hours  Central Line:   None             Review of Systems:   Pertinent items are noted in HPI.         Vital Signs:    Last 24hrs VS reviewed since prior progress note. Most recent are:  Vitals:    24 1000   BP:    Pulse: 91   Resp:    Temp:    SpO2:          Intake/Output Summary (Last 24 hours) at 2024 1138  Last data filed at 2024 1123  Gross per 24 hour   Intake --   Output 400 ml   Net -400 ml        Physical Examination:     I had a face to face encounter with this patient and independently examined them on 2024 as outlined below:          General : alert x 3,  chloride flush 0.9 % injection 5-40 mL  5-40 mL IntraVENous 2 times per day    sodium chloride flush 0.9 % injection 5-40 mL  5-40 mL IntraVENous PRN    0.9 % sodium chloride infusion   IntraVENous PRN    enoxaparin (LOVENOX) injection 40 mg  40 mg SubCUTAneous Daily    ondansetron (ZOFRAN-ODT) disintegrating tablet 4 mg  4 mg Oral Q8H PRN    Or    ondansetron (ZOFRAN) injection 4 mg  4 mg IntraVENous Q6H PRN    polyethylene glycol (GLYCOLAX) packet 17 g  17 g Oral Daily PRN    acetaminophen (TYLENOL) tablet 650 mg  650 mg Oral Q6H PRN    Or    acetaminophen (TYLENOL) suppository 650 mg  650 mg Rectal Q6H PRN    albuterol (PROVENTIL) (2.5 MG/3ML) 0.083% nebulizer solution 2.5 mg  2.5 mg Nebulization Q6H PRN    benzonatate (TESSALON) capsule 100 mg  100 mg Oral TID PRN    guaiFENesin (MUCINEX) extended release tablet 600 mg  600 mg Oral BID     ______________________________________________________________________  EXPECTED LENGTH OF STAY: 5  ACTUAL LENGTH OF STAY:          3                 Jeremy Alexander MD

## 2024-06-25 LAB
ANION GAP SERPL CALC-SCNC: 6 MMOL/L (ref 5–15)
BACTERIA SPEC CULT: NORMAL
BASOPHILS # BLD: 0.1 K/UL (ref 0–0.1)
BASOPHILS NFR BLD: 1 % (ref 0–1)
BUN SERPL-MCNC: 9 MG/DL (ref 6–20)
BUN/CREAT SERPL: 9 (ref 12–20)
CALCIUM SERPL-MCNC: 8.8 MG/DL (ref 8.5–10.1)
CHLORIDE SERPL-SCNC: 106 MMOL/L (ref 97–108)
CO2 SERPL-SCNC: 19 MMOL/L (ref 21–32)
CREAT SERPL-MCNC: 0.95 MG/DL (ref 0.7–1.3)
DIFFERENTIAL METHOD BLD: ABNORMAL
EOSINOPHIL # BLD: 0.3 K/UL (ref 0–0.4)
EOSINOPHIL NFR BLD: 2 % (ref 0–7)
ERYTHROCYTE [DISTWIDTH] IN BLOOD BY AUTOMATED COUNT: 12.5 % (ref 11.5–14.5)
GLUCOSE SERPL-MCNC: 127 MG/DL (ref 65–100)
GRAM STN SPEC: NORMAL
HCT VFR BLD AUTO: 44.3 % (ref 36.6–50.3)
HGB BLD-MCNC: 15.7 G/DL (ref 12.1–17)
IMM GRANULOCYTES # BLD AUTO: 0.1 K/UL (ref 0–0.04)
IMM GRANULOCYTES NFR BLD AUTO: 0 % (ref 0–0.5)
LYMPHOCYTES # BLD: 2.3 K/UL (ref 0.8–3.5)
LYMPHOCYTES NFR BLD: 19 % (ref 12–49)
MCH RBC QN AUTO: 32.2 PG (ref 26–34)
MCHC RBC AUTO-ENTMCNC: 35.4 G/DL (ref 30–36.5)
MCV RBC AUTO: 90.8 FL (ref 80–99)
MONOCYTES # BLD: 0.9 K/UL (ref 0–1)
MONOCYTES NFR BLD: 8 % (ref 5–13)
NEUTS SEG # BLD: 8.3 K/UL (ref 1.8–8)
NEUTS SEG NFR BLD: 70 % (ref 32–75)
NRBC # BLD: 0 K/UL (ref 0–0.01)
NRBC BLD-RTO: 0 PER 100 WBC
PLATELET # BLD AUTO: 415 K/UL (ref 150–400)
PMV BLD AUTO: 9.2 FL (ref 8.9–12.9)
POTASSIUM SERPL-SCNC: 4.1 MMOL/L (ref 3.5–5.1)
RBC # BLD AUTO: 4.88 M/UL (ref 4.1–5.7)
SERVICE CMNT-IMP: NORMAL
SODIUM SERPL-SCNC: 131 MMOL/L (ref 136–145)
WBC # BLD AUTO: 11.9 K/UL (ref 4.1–11.1)

## 2024-06-25 PROCEDURE — 80048 BASIC METABOLIC PNL TOTAL CA: CPT

## 2024-06-25 PROCEDURE — 87116 MYCOBACTERIA CULTURE: CPT

## 2024-06-25 PROCEDURE — 85025 COMPLETE CBC W/AUTO DIFF WBC: CPT

## 2024-06-25 PROCEDURE — 6370000000 HC RX 637 (ALT 250 FOR IP): Performed by: FAMILY MEDICINE

## 2024-06-25 PROCEDURE — 87206 SMEAR FLUORESCENT/ACID STAI: CPT

## 2024-06-25 PROCEDURE — 6360000002 HC RX W HCPCS: Performed by: FAMILY MEDICINE

## 2024-06-25 PROCEDURE — 36415 COLL VENOUS BLD VENIPUNCTURE: CPT

## 2024-06-25 PROCEDURE — 1100000000 HC RM PRIVATE

## 2024-06-25 PROCEDURE — 2580000003 HC RX 258: Performed by: FAMILY MEDICINE

## 2024-06-25 RX ADMIN — GUAIFENESIN 600 MG: 600 TABLET, EXTENDED RELEASE ORAL at 11:47

## 2024-06-25 RX ADMIN — PIPERACILLIN AND TAZOBACTAM 3375 MG: 3; .375 INJECTION, POWDER, LYOPHILIZED, FOR SOLUTION INTRAVENOUS at 16:59

## 2024-06-25 RX ADMIN — GUAIFENESIN 600 MG: 600 TABLET, EXTENDED RELEASE ORAL at 21:10

## 2024-06-25 RX ADMIN — PIPERACILLIN AND TAZOBACTAM 3375 MG: 3; .375 INJECTION, POWDER, LYOPHILIZED, FOR SOLUTION INTRAVENOUS at 06:46

## 2024-06-25 RX ADMIN — SODIUM CHLORIDE, PRESERVATIVE FREE 10 ML: 5 INJECTION INTRAVENOUS at 21:10

## 2024-06-25 ASSESSMENT — PAIN SCALES - GENERAL: PAINLEVEL_OUTOF10: 0

## 2024-06-25 NOTE — PLAN OF CARE
Problem: Safety - Adult  Goal: Free from fall injury  Outcome: Progressing     Problem: Respiratory - Adult  Goal: Effective breathing pattern  Outcome: Progressing     Problem: Infection - Adult  Goal: Able to breathe comfortably  Description: Able to breathe comfortably  Outcome: Progressing  Goal: *Absence of infection signs and symptoms  Outcome: Progressing  Goal: Effective breathing pattern  Description: Effective breathing pattern  Outcome: Progressing     Problem: Pain  Goal: Verbalizes/displays adequate comfort level or baseline comfort level  Outcome: Progressing

## 2024-06-25 NOTE — PROGRESS NOTES
Hospitalist Progress Note  Jeremy Alexander MD  Answering service: [phone]        Date of Service:  2024  NAME:  Jun Andujar  :  2005  MRN:  [id number]      Admission Summary:     Patient transferred from [facility] with pneumonia and lung abscess.    Interval history / Subjective:     Patient denies any chest pain or shortness of breath.     Assessment & Plan:     Sepsis  -Patient presented with tachycardia and leukocytosis  -Sepsis due to pneumonia with lung abscess  -Sepsis reassessment completed, follow cultures, monitor hemodynamics, manage underlying etiology    Lung abscess  -Chest x-ray shows severe infectious or inflammatory process with large area of consolidation left mid and lower lungs with several areas of lucency suggesting developing lung abscesses or necrosis with pneumonia and cavitary nodule in the right upper lobe  -CT chest was ordered but still not done, QuantiFERON gold pending, s/p sputum for AFB x 3, blood cultures so far no growth, respiratory viral panel negative, Legionella negative  -Continue Zosyn, pulmonology following, RN to call radiology to move forward with CT chest    Asthma  -Stable, breathing treatment.    Substance use disorder  -Patient noted to use marijuana  -Counseling done     Code status: Full  Prophylaxis: Lovenox  Care Plan discussed with: Patient  Anticipated Disposition: 24 to 48 hours  Central Line:   None             Review of Systems:   Pertinent items are noted in HPI.         Vital Signs:    Last 24hrs VS reviewed since prior progress note. Most recent are:  Vitals:    24 1200   BP:    Pulse: 89   Resp:    Temp:    SpO2:          Intake/Output Summary (Last 24 hours) at 2024 1317  Last data filed at 2024 1149  Gross per 24 hour   Intake --   Output 500 ml   Net -500 ml        Physical Examination:     I had a face to face encounter with this patient  Facility-Administered Medications   Medication Dose Route Frequency    piperacillin-tazobactam (ZOSYN) 3,375 mg in sodium chloride 0.9 % 50 mL IVPB (mini-bag)  3,375 mg IntraVENous Q8H    sodium chloride flush 0.9 % injection 5-40 mL  5-40 mL IntraVENous 2 times per day    sodium chloride flush 0.9 % injection 5-40 mL  5-40 mL IntraVENous PRN    0.9 % sodium chloride infusion   IntraVENous PRN    enoxaparin (LOVENOX) injection 40 mg  40 mg SubCUTAneous Daily    ondansetron (ZOFRAN-ODT) disintegrating tablet 4 mg  4 mg Oral Q8H PRN    Or    ondansetron (ZOFRAN) injection 4 mg  4 mg IntraVENous Q6H PRN    polyethylene glycol (GLYCOLAX) packet 17 g  17 g Oral Daily PRN    acetaminophen (TYLENOL) tablet 650 mg  650 mg Oral Q6H PRN    Or    acetaminophen (TYLENOL) suppository 650 mg  650 mg Rectal Q6H PRN    albuterol (PROVENTIL) (2.5 MG/3ML) 0.083% nebulizer solution 2.5 mg  2.5 mg Nebulization Q6H PRN    benzonatate (TESSALON) capsule 100 mg  100 mg Oral TID PRN    guaiFENesin (MUCINEX) extended release tablet 600 mg  600 mg Oral BID     ______________________________________________________________________  EXPECTED LENGTH OF STAY: 6  ACTUAL LENGTH OF STAY:          4                 Jeremy Alexander MD

## 2024-06-25 NOTE — PROGRESS NOTES
PCCM:    VSS    AFBs 2 afbs pending     Plan:    Abnormal chest x-ray    Pending ruled out for TB   On zosyn   Needs one more AFB     Brian Rodriguez PA-C

## 2024-06-26 ENCOUNTER — APPOINTMENT (OUTPATIENT)
Facility: HOSPITAL | Age: 19
End: 2024-06-26
Attending: INTERNAL MEDICINE
Payer: MEDICAID

## 2024-06-26 LAB
ACID FAST STN SPEC: NEGATIVE
ACID FAST STN SPEC: NEGATIVE
ANION GAP SERPL CALC-SCNC: 5 MMOL/L (ref 5–15)
BACTERIA SPEC CULT: NORMAL
BACTERIA SPEC CULT: NORMAL
BASOPHILS # BLD: 0.1 K/UL (ref 0–0.1)
BASOPHILS NFR BLD: 1 % (ref 0–1)
BUN SERPL-MCNC: 10 MG/DL (ref 6–20)
BUN/CREAT SERPL: 9 (ref 12–20)
CALCIUM SERPL-MCNC: 9.3 MG/DL (ref 8.5–10.1)
CHLORIDE SERPL-SCNC: 103 MMOL/L (ref 97–108)
CO2 SERPL-SCNC: 26 MMOL/L (ref 21–32)
CREAT SERPL-MCNC: 1.13 MG/DL (ref 0.7–1.3)
DIFFERENTIAL METHOD BLD: ABNORMAL
EOSINOPHIL # BLD: 0.2 K/UL (ref 0–0.4)
EOSINOPHIL NFR BLD: 2 % (ref 0–7)
ERYTHROCYTE [DISTWIDTH] IN BLOOD BY AUTOMATED COUNT: 12.6 % (ref 11.5–14.5)
GLUCOSE SERPL-MCNC: 142 MG/DL (ref 65–100)
HCT VFR BLD AUTO: 45.9 % (ref 36.6–50.3)
HGB BLD-MCNC: 16.1 G/DL (ref 12.1–17)
IMM GRANULOCYTES # BLD AUTO: 0.1 K/UL (ref 0–0.04)
IMM GRANULOCYTES NFR BLD AUTO: 1 % (ref 0–0.5)
LYMPHOCYTES # BLD: 1.7 K/UL (ref 0.8–3.5)
LYMPHOCYTES NFR BLD: 18 % (ref 12–49)
MCH RBC QN AUTO: 31.7 PG (ref 26–34)
MCHC RBC AUTO-ENTMCNC: 35.1 G/DL (ref 30–36.5)
MCV RBC AUTO: 90.4 FL (ref 80–99)
MONOCYTES # BLD: 0.6 K/UL (ref 0–1)
MONOCYTES NFR BLD: 6 % (ref 5–13)
MYCOBACTERIUM SPEC QL CULT: NORMAL
MYCOBACTERIUM SPEC QL CULT: NORMAL
NEUTS SEG # BLD: 7.2 K/UL (ref 1.8–8)
NEUTS SEG NFR BLD: 72 % (ref 32–75)
NRBC # BLD: 0 K/UL (ref 0–0.01)
NRBC BLD-RTO: 0 PER 100 WBC
PLATELET # BLD AUTO: 428 K/UL (ref 150–400)
PMV BLD AUTO: 9 FL (ref 8.9–12.9)
POTASSIUM SERPL-SCNC: 4 MMOL/L (ref 3.5–5.1)
RBC # BLD AUTO: 5.08 M/UL (ref 4.1–5.7)
SERVICE CMNT-IMP: NORMAL
SERVICE CMNT-IMP: NORMAL
SODIUM SERPL-SCNC: 134 MMOL/L (ref 136–145)
SPECIMEN PREPARATION: NORMAL
SPECIMEN PREPARATION: NORMAL
SPECIMEN SOURCE: NORMAL
SPECIMEN SOURCE: NORMAL
WBC # BLD AUTO: 9.9 K/UL (ref 4.1–11.1)

## 2024-06-26 PROCEDURE — 71260 CT THORAX DX C+: CPT

## 2024-06-26 PROCEDURE — 85025 COMPLETE CBC W/AUTO DIFF WBC: CPT

## 2024-06-26 PROCEDURE — 6370000000 HC RX 637 (ALT 250 FOR IP): Performed by: FAMILY MEDICINE

## 2024-06-26 PROCEDURE — 2580000003 HC RX 258: Performed by: FAMILY MEDICINE

## 2024-06-26 PROCEDURE — 1100000000 HC RM PRIVATE

## 2024-06-26 PROCEDURE — 80048 BASIC METABOLIC PNL TOTAL CA: CPT

## 2024-06-26 PROCEDURE — 36415 COLL VENOUS BLD VENIPUNCTURE: CPT

## 2024-06-26 PROCEDURE — 6360000002 HC RX W HCPCS: Performed by: FAMILY MEDICINE

## 2024-06-26 PROCEDURE — 6360000004 HC RX CONTRAST MEDICATION: Performed by: RADIOLOGY

## 2024-06-26 RX ADMIN — SODIUM CHLORIDE: 9 INJECTION, SOLUTION INTRAVENOUS at 00:43

## 2024-06-26 RX ADMIN — IOPAMIDOL 100 ML: 612 INJECTION, SOLUTION INTRAVENOUS at 12:58

## 2024-06-26 RX ADMIN — ACETAMINOPHEN 650 MG: 325 TABLET ORAL at 12:28

## 2024-06-26 RX ADMIN — PIPERACILLIN AND TAZOBACTAM 3375 MG: 3; .375 INJECTION, POWDER, LYOPHILIZED, FOR SOLUTION INTRAVENOUS at 08:26

## 2024-06-26 RX ADMIN — PIPERACILLIN AND TAZOBACTAM 3375 MG: 3; .375 INJECTION, POWDER, LYOPHILIZED, FOR SOLUTION INTRAVENOUS at 15:25

## 2024-06-26 RX ADMIN — GUAIFENESIN 600 MG: 600 TABLET, EXTENDED RELEASE ORAL at 08:25

## 2024-06-26 RX ADMIN — SODIUM CHLORIDE, PRESERVATIVE FREE 10 ML: 5 INJECTION INTRAVENOUS at 20:17

## 2024-06-26 RX ADMIN — PIPERACILLIN AND TAZOBACTAM 3375 MG: 3; .375 INJECTION, POWDER, LYOPHILIZED, FOR SOLUTION INTRAVENOUS at 00:25

## 2024-06-26 RX ADMIN — GUAIFENESIN 600 MG: 600 TABLET, EXTENDED RELEASE ORAL at 20:16

## 2024-06-26 ASSESSMENT — PAIN DESCRIPTION - LOCATION: LOCATION: GENERALIZED

## 2024-06-26 ASSESSMENT — PAIN SCALES - GENERAL: PAINLEVEL_OUTOF10: 7

## 2024-06-26 ASSESSMENT — PAIN DESCRIPTION - DESCRIPTORS: DESCRIPTORS: ACHING

## 2024-06-26 NOTE — PROGRESS NOTES
PCCM:    VSS    AFBs 3 afbs pending     Plan:    Abnormal chest x-ray    Pending ruled out for TB   On zosyn   Imaging pending   Discussed with hospitalist yesterday      Brian Rodriguez PA-C

## 2024-06-26 NOTE — PROGRESS NOTES
Hospitalist Progress Note  Jeremy Alexander MD  Answering service: [phone]        Date of Service:  2024  NAME:  Jun Andujar  :  2005  MRN:  [id number]      Admission Summary:     Patient transferred from [facility] with pneumonia and lung abscess.    Interval history / Subjective:     Patient denies any chest pain or shortness of breath.  Overall improving with this cough.     Assessment & Plan:     Sepsis  -Patient presented with tachycardia and leukocytosis  -Sepsis due to pneumonia with lung abscess  -Sepsis reassessment completed, follow cultures, monitor hemodynamics, manage underlying etiology    Lung abscess  -Chest x-ray shows severe infectious or inflammatory process with large area of consolidation left mid and lower lungs with several areas of lucency suggesting developing lung abscesses or necrosis with pneumonia and cavitary nodule in the right upper lobe  -CT chest was ordered but still not done, QuantiFERON gold pending, s/p sputum for AFB x 3, blood cultures so far no growth, respiratory viral panel negative, Legionella negative  -Continue Zosyn, pulmonology following, CT chest was reordered, pending at this time    Asthma  -Stable, breathing treatment.    Substance use disorder  -Patient noted to use marijuana  -Counseling done     Code status: Full  Prophylaxis: Lovenox  Care Plan discussed with: Patient  Anticipated Disposition: 24 to 48 hours  Central Line:   None             Review of Systems:   Pertinent items are noted in HPI.         Vital Signs:    Last 24hrs VS reviewed since prior progress note. Most recent are:  Vitals:    24 1000   BP:    Pulse: 84   Resp:    Temp:    SpO2:          Intake/Output Summary (Last 24 hours) at 2024 1142  Last data filed at 2024 1149  Gross per 24 hour   Intake --   Output 500 ml   Net -500 ml        Physical Examination:     I had a face to

## 2024-06-27 LAB
ACID FAST STN SPEC: NEGATIVE
ANION GAP SERPL CALC-SCNC: 6 MMOL/L (ref 5–15)
BASOPHILS # BLD: 0.1 K/UL (ref 0–0.1)
BASOPHILS NFR BLD: 1 % (ref 0–1)
BUN SERPL-MCNC: 11 MG/DL (ref 6–20)
BUN/CREAT SERPL: 9 (ref 12–20)
CALCIUM SERPL-MCNC: 9.5 MG/DL (ref 8.5–10.1)
CHLORIDE SERPL-SCNC: 106 MMOL/L (ref 97–108)
CO2 SERPL-SCNC: 24 MMOL/L (ref 21–32)
CREAT SERPL-MCNC: 1.21 MG/DL (ref 0.7–1.3)
CRP SERPL-MCNC: 5.23 MG/DL (ref 0–0.3)
DIFFERENTIAL METHOD BLD: ABNORMAL
EOSINOPHIL # BLD: 0.3 K/UL (ref 0–0.4)
EOSINOPHIL NFR BLD: 3 % (ref 0–7)
ERYTHROCYTE [DISTWIDTH] IN BLOOD BY AUTOMATED COUNT: 12.5 % (ref 11.5–14.5)
ERYTHROCYTE [SEDIMENTATION RATE] IN BLOOD: 56 MM/HR (ref 0–15)
GLUCOSE SERPL-MCNC: 90 MG/DL (ref 65–100)
HCT VFR BLD AUTO: 47.7 % (ref 36.6–50.3)
HGB BLD-MCNC: 16.9 G/DL (ref 12.1–17)
IMM GRANULOCYTES # BLD AUTO: 0 K/UL (ref 0–0.04)
IMM GRANULOCYTES NFR BLD AUTO: 0 % (ref 0–0.5)
LYMPHOCYTES # BLD: 2 K/UL (ref 0.8–3.5)
LYMPHOCYTES NFR BLD: 20 % (ref 12–49)
MCH RBC QN AUTO: 31.9 PG (ref 26–34)
MCHC RBC AUTO-ENTMCNC: 35.4 G/DL (ref 30–36.5)
MCV RBC AUTO: 90.2 FL (ref 80–99)
MONOCYTES # BLD: 0.5 K/UL (ref 0–1)
MONOCYTES NFR BLD: 5 % (ref 5–13)
MYCOBACTERIUM SPEC QL CULT: NORMAL
NEUTS SEG # BLD: 7 K/UL (ref 1.8–8)
NEUTS SEG NFR BLD: 71 % (ref 32–75)
NRBC # BLD: 0 K/UL (ref 0–0.01)
NRBC BLD-RTO: 0 PER 100 WBC
PLATELET # BLD AUTO: 520 K/UL (ref 150–400)
PMV BLD AUTO: 9 FL (ref 8.9–12.9)
POTASSIUM SERPL-SCNC: 4.2 MMOL/L (ref 3.5–5.1)
RBC # BLD AUTO: 5.29 M/UL (ref 4.1–5.7)
SODIUM SERPL-SCNC: 136 MMOL/L (ref 136–145)
SPECIMEN PREPARATION: NORMAL
SPECIMEN SOURCE: NORMAL
WBC # BLD AUTO: 9.9 K/UL (ref 4.1–11.1)

## 2024-06-27 PROCEDURE — 82785 ASSAY OF IGE: CPT

## 2024-06-27 PROCEDURE — 80048 BASIC METABOLIC PNL TOTAL CA: CPT

## 2024-06-27 PROCEDURE — 86635 COCCIDIOIDES ANTIBODY: CPT

## 2024-06-27 PROCEDURE — 1100000000 HC RM PRIVATE

## 2024-06-27 PROCEDURE — 6370000000 HC RX 637 (ALT 250 FOR IP): Performed by: FAMILY MEDICINE

## 2024-06-27 PROCEDURE — 85025 COMPLETE CBC W/AUTO DIFF WBC: CPT

## 2024-06-27 PROCEDURE — 2580000003 HC RX 258: Performed by: FAMILY MEDICINE

## 2024-06-27 PROCEDURE — 87305 ASPERGILLUS AG IA: CPT

## 2024-06-27 PROCEDURE — 6360000002 HC RX W HCPCS: Performed by: FAMILY MEDICINE

## 2024-06-27 PROCEDURE — 87385 HISTOPLASMA CAPSUL AG IA: CPT

## 2024-06-27 PROCEDURE — 85652 RBC SED RATE AUTOMATED: CPT

## 2024-06-27 PROCEDURE — 36415 COLL VENOUS BLD VENIPUNCTURE: CPT

## 2024-06-27 PROCEDURE — 86140 C-REACTIVE PROTEIN: CPT

## 2024-06-27 PROCEDURE — 86612 BLASTOMYCES ANTIBODY: CPT

## 2024-06-27 RX ADMIN — PIPERACILLIN AND TAZOBACTAM 3375 MG: 3; .375 INJECTION, POWDER, LYOPHILIZED, FOR SOLUTION INTRAVENOUS at 23:43

## 2024-06-27 RX ADMIN — GUAIFENESIN 600 MG: 600 TABLET, EXTENDED RELEASE ORAL at 08:58

## 2024-06-27 RX ADMIN — PIPERACILLIN AND TAZOBACTAM 3375 MG: 3; .375 INJECTION, POWDER, LYOPHILIZED, FOR SOLUTION INTRAVENOUS at 08:58

## 2024-06-27 RX ADMIN — PIPERACILLIN AND TAZOBACTAM 3375 MG: 3; .375 INJECTION, POWDER, LYOPHILIZED, FOR SOLUTION INTRAVENOUS at 00:21

## 2024-06-27 RX ADMIN — SODIUM CHLORIDE, PRESERVATIVE FREE 10 ML: 5 INJECTION INTRAVENOUS at 21:02

## 2024-06-27 RX ADMIN — GUAIFENESIN 600 MG: 600 TABLET, EXTENDED RELEASE ORAL at 21:00

## 2024-06-27 RX ADMIN — PIPERACILLIN AND TAZOBACTAM 3375 MG: 3; .375 INJECTION, POWDER, LYOPHILIZED, FOR SOLUTION INTRAVENOUS at 17:29

## 2024-06-27 ASSESSMENT — PAIN SCALES - GENERAL: PAINLEVEL_OUTOF10: 0

## 2024-06-27 NOTE — PROGRESS NOTES
Pt was off of telemetry at 12:08 hours. Called US to check on pt/advise RN.  Called US again and was advised that pt was in the shower. Called multiple times speaking to US and pt was still in the shower.  Spoke to pt's RN Ritika and asked for pt to be put back on tele.  Spoke to charge RN, Hansa who stated that pt was just now getting into the shower and the time was 14:15 hours.  Pt is in an isolation/droplet precaution room. Pt resumed telemetry a 14:43 hours.

## 2024-06-27 NOTE — PROGRESS NOTES
Hospitalist Progress Note  Nae Jones MD  Answering service: [phone]        Date of Service:  2024  NAME:  Jun Andujar  :  2005  MRN:  [id number]      Admission Summary:     Patient transferred from [facility] with pneumonia and lung abscess.    Interval history / Subjective:     Patient denies any chest pain or shortness of breath. Cough/congestion improving  Anxious about not knowing the POC  Discussed POC with pt and questions answered to best of ability  NAD  AFVSS     Assessment & Plan:     Sepsis (tachycardia and leukocytosis) due to pneumonia  Cavitary lung disease- fungus vs bacterial vs sarcoid?  -Chest x-ray shows severe infectious or inflammatory process with large area of consolidation left mid and lower lungs with several areas of lucency suggesting developing lung abscesses or necrosis with pneumonia and cavitary nodule in the right upper lobe  -CT chest shows Bilateral airspace disease with multiple bilateral areas of cavitation  -AFB x2 negative, 3rd pending  -sputum cx nl rep yash  -BC NG  -QuantiFERON gold, fungus, IgE, ESR, ACE pending  -respiratory viral panel negative, Legionella negative  -on Zosyn, pulmonology following    Asthma  -Stable    Substance use disorder  -Patient noted to use marijuana  -Counseling done     Code status: Full  Prophylaxis: Lovenox  Care Plan discussed with: Patient  Anticipated Disposition: home 24 to 48 hours  Central Line:   None       Review of Systems:   Pertinent items are noted in HPI.       Vital Signs:    Last 24hrs VS reviewed since prior progress note. Most recent are:  Vitals:    24 1200   BP:    Pulse: 84   Resp:    Temp:    SpO2:        No intake or output data in the 24 hours ending 24 1258       Physical Examination:     I had a face to face encounter with this patient and independently examined them on 2024 as outlined

## 2024-06-27 NOTE — PROGRESS NOTES
Pulmonary, Critical Care, and Sleep Medicine      Name: Jun Andujar MRN: [id number]   : 2005 Hospital: [facility]   Date: 2024  Admission date: 2024 Hospital Day: 7   Patient PCP: No primary care provider on file.    History:   Pt is acutely ill . Medical records and data reviewed. Pt seen in consultation    IMPRESSION:   Cavitary lung disease- fungus vs bacterial vs sarcoid?   Leukocytosis  Persistent cough  Asthma  Marijuana use   hypoalbuminemia  Body mass index is 23.6 kg/m².      RECOMMENDATIONS/PLAN:   AFB sputum x 2 negative. Last one pending   Follow-up routine sputum culture  Paired blood cultures; negative  Consider HIV testing, patient declines testing  Bronchial hygiene with respiratory therapy techniques, Incentive spirometer; flutter device  Check fungus, IgE, SER, CRP, ACE   DVT prophylaxis  Patient is insistent on going home soon     Interval history:  : feeling better. Less congestion. Noted CT scan         [x] High complexity decision making was performed  [x] See my orders for details      Initial HPI:      I was asked by Alex Morrow MD to see Jun Andujar  a 18 y.o.   male in consultation for a chief complaint of abnormal chest x-ray    Excerpts from admission 2024 or consult notes as follows:     \" Jun Andujar is a 18 y.o. male with past medical history of asthma presented as a direct admission/transfer from [facility] ED to [facility] with chief complaints of sore throat and cough.  Symptoms onset reported again about 2 weeks ago with cough productive of white sputum, and sore throat which is remain constant, now severe, without specific exacerbating leaving factors.  Patient was seen twice at [facility], had been prescribed steroids, inhalers, and Mucinex without relief of symptoms.  Patient then came to [facility] ED on 6/15/2024, had a rapid strep screen which was negative was  upper lobe 5.9 x  3.1 cm thick-walled cavitary nodule in the left upper lobe. Numerous other  smaller cavitary nodules throughout both upper lobes. There is some associated  nodular airspace disease. INCIDENTALLY IMAGED UPPER ABDOMEN: No significant  abnormality in the incidentally imaged upper abdomen.  BONES: No destructive bone lesion.  Impression: Bilateral airspace disease with multiple bilateral areas of cavitation. Primary  differential consideration includes fungal, bacterial, or mycobacterial  pneumonia or septic emboli.    Electronically signed by Kavon Palma    CT CHEST W CONTRAST   Final Result   Bilateral airspace disease with multiple bilateral areas of cavitation. Primary   differential consideration includes fungal, bacterial, or mycobacterial   pneumonia or septic emboli.         Electronically signed by Kavon Palma        XR CHEST PORTABLE    Result Date: 6/21/2024  EXAM:  XR CHEST PORTABLE INDICATION: cough completed z pk COMPARISON: none TECHNIQUE: 1411 portable chest AP view FINDINGS: There is an ill-defined cavitary nodule measuring 4 cm in the right upper lung zone. There is a large area of opacity in the left mid and lower lung zone laterally measuring approximately 15 cm in height by 7 cm in width with several areas of air lucency within this. This is likely dense infiltrate with central cavitation or necrosis. There also appears to be some adjacent pleural thickening. No free pleural fluid. No adenopathy. Heart size normal. Osseous structures are unremarkable.     1. Findings are most likely due to severe infectious or inflammatory process. Large area of consolidation in the left mid and lower lung zone with several areas of lucency suggest developing lung abscesses or necrosis within the pneumonia. 2. There is an ill-defined 4 cm cavitary nodule in the right upper lobe. Electronically signed by MADELYN SALOMON I personally reviewed laboratory testing, pulmonary imaging, radiology

## 2024-06-28 VITALS
RESPIRATION RATE: 18 BRPM | TEMPERATURE: 98.6 F | SYSTOLIC BLOOD PRESSURE: 138 MMHG | HEART RATE: 97 BPM | BODY MASS INDEX: 23.56 KG/M2 | OXYGEN SATURATION: 100 % | DIASTOLIC BLOOD PRESSURE: 66 MMHG | WEIGHT: 133 LBS

## 2024-06-28 PROCEDURE — 2580000003 HC RX 258: Performed by: INTERNAL MEDICINE

## 2024-06-28 PROCEDURE — 6360000002 HC RX W HCPCS: Performed by: FAMILY MEDICINE

## 2024-06-28 PROCEDURE — 6370000000 HC RX 637 (ALT 250 FOR IP): Performed by: FAMILY MEDICINE

## 2024-06-28 PROCEDURE — 94760 N-INVAS EAR/PLS OXIMETRY 1: CPT

## 2024-06-28 PROCEDURE — 2580000003 HC RX 258: Performed by: FAMILY MEDICINE

## 2024-06-28 PROCEDURE — 6360000002 HC RX W HCPCS: Performed by: INTERNAL MEDICINE

## 2024-06-28 RX ORDER — AMOXICILLIN AND CLAVULANATE POTASSIUM 875; 125 MG/1; MG/1
1 TABLET, FILM COATED ORAL 2 TIMES DAILY
Qty: 42 TABLET | Refills: 0 | Status: SHIPPED | OUTPATIENT
Start: 2024-06-28 | End: 2024-07-19

## 2024-06-28 RX ADMIN — GUAIFENESIN 600 MG: 600 TABLET, EXTENDED RELEASE ORAL at 08:46

## 2024-06-28 RX ADMIN — PIPERACILLIN AND TAZOBACTAM 3375 MG: 3; .375 INJECTION, POWDER, LYOPHILIZED, FOR SOLUTION INTRAVENOUS at 16:28

## 2024-06-28 RX ADMIN — PIPERACILLIN AND TAZOBACTAM 3375 MG: 3; .375 INJECTION, POWDER, LYOPHILIZED, FOR SOLUTION INTRAVENOUS at 07:15

## 2024-06-28 ASSESSMENT — PAIN SCALES - GENERAL: PAINLEVEL_OUTOF10: 0

## 2024-06-28 NOTE — DISCHARGE SUMMARY
Physician Discharge Summary     Patient ID:    Jun Andujar  [id number]  18 y.o.  2005    Admit date: 6/21/2024    Discharge date and time: 6/28/2024 4:06 PM    Discharge condition: Stable    Admission HPI:  Jun Andujar is a 18 y.o. male with past medical history of asthma presented as a direct admission/transfer from [facility] ED to [facility] with chief complaints of sore throat and cough.  Symptoms onset reported again about 2 weeks ago with cough productive of white sputum, and sore throat which is remain constant, now severe, without specific exacerbating leaving factors.  Patient was seen twice at [facility], had been prescribed steroids, inhalers, and Mucinex without relief of symptoms.  Patient then came to [facility] ED on 6/15/2024, had a rapid strep screen which was negative was discharged home.  In the interim of these ER visits, patient has been taking doxycycline, azithromycin, prednisone, ibuprofen, and albuterol still with no relief of symptoms.  Later ER note some intermittent history of fever and chills but not apparent.  Patient feels occasional fullness in his left chest.  Patient then returned to [facility] ED today.  Abnormal labs included WBC 26.1, neutrophils 82%, serum CO2 33, albumin 3.0.  Urinalysis showed glucose 500.  Chest xray portable showed findings most likely due to severe infectious or inflammatory process with large area of consolidation left mid and lower lungs with several areas of lucency suggesting developing lung abscesses or necrosis within pneumonia with ill-defined 4 cm cavitary nodule in right upper lobe.  ED ordered 0.9% NS 1000 mL + 1557 ml IV boluses, ceftriaxone 1000 mg IV, levofloxacin 750 mg IV, and Zosyn 3375 mg IV x 1 dose.  ED consulted pulmonologist.  ED requested transfer to [facility], as medicine service noted patient needed higher level of care.          [facility]  MD Robert  6/28/2024  4:06 PM

## 2024-06-28 NOTE — PROGRESS NOTES
Hospitalist Progress Note  Nae Jones MD  Answering service: [phone]        Date of Service:  2024  NAME:  Jun Andujar  :  2005  MRN:  [id number]      Admission Summary:     Patient transferred from [facility] with pneumonia and lung abscess.    Interval history / Subjective:     Patient denies any chest pain or shortness of breath. Cough/congestion improved  NAD  AFVSS     Assessment & Plan:     Sepsis (tachycardia and leukocytosis) due to pneumonia  Cavitary lung disease- fungus vs bacterial vs sarcoid?  -Chest x-ray shows severe infectious or inflammatory process with large area of consolidation left mid and lower lungs with several areas of lucency suggesting developing lung abscesses or necrosis with pneumonia and cavitary nodule in the right upper lobe  -CT chest shows Bilateral airspace disease with multiple bilateral areas of cavitation  -AFB x3 negative  -sputum cx nl rep yash  -BC NG  -ESR 56  -respiratory viral panel negative, Legionella negative  -QuantiFERON gold, fungus, IgE, ACE pending  -completes d7 Zosyn today  -pulmonology following, await further recs    Asthma  -Stable    Substance use disorder  -Patient noted to use marijuana  -Counseling done     Code status: Full  Prophylaxis: Lovenox  Care Plan discussed with: Patient  Anticipated Disposition: home 24 to 48 hours when ok with pulm  Central Line:   None       Review of Systems:   Pertinent items are noted in HPI.       Vital Signs:    Last 24hrs VS reviewed since prior progress note. Most recent are:  Vitals:    24 1000   BP:    Pulse: 82   Resp:    Temp:    SpO2:          Intake/Output Summary (Last 24 hours) at 2024 1314  Last data filed at 2024 2200  Gross per 24 hour   Intake 600 ml   Output --   Net 600 ml          Physical Examination:     I had a face to face encounter with this patient and independently examined  IntraVENous PRN    0.9 % sodium chloride infusion   IntraVENous PRN    enoxaparin (LOVENOX) injection 40 mg  40 mg SubCUTAneous Daily    ondansetron (ZOFRAN-ODT) disintegrating tablet 4 mg  4 mg Oral Q8H PRN    Or    ondansetron (ZOFRAN) injection 4 mg  4 mg IntraVENous Q6H PRN    polyethylene glycol (GLYCOLAX) packet 17 g  17 g Oral Daily PRN    acetaminophen (TYLENOL) tablet 650 mg  650 mg Oral Q6H PRN    Or    acetaminophen (TYLENOL) suppository 650 mg  650 mg Rectal Q6H PRN    albuterol (PROVENTIL) (2.5 MG/3ML) 0.083% nebulizer solution 2.5 mg  2.5 mg Nebulization Q6H PRN    benzonatate (TESSALON) capsule 100 mg  100 mg Oral TID PRN    guaiFENesin (MUCINEX) extended release tablet 600 mg  600 mg Oral BID     ______________________________________________________________________  EXPECTED LENGTH OF STAY: 7  ACTUAL LENGTH OF STAY:          7                 Nae Jones MD

## 2024-06-28 NOTE — PROGRESS NOTES
Pulmonary, Critical Care, and Sleep Medicine      Name: Jun Andujar MRN: [id number]   : 2005 Hospital: [facility]   Date: 2024  Admission date: 2024 Hospital Day: 8   Patient PCP: No primary care provider on file.    History:   Pt is acutely ill . Medical records and data reviewed. Pt seen in consultation    IMPRESSION:   Cavitary lung disease- fungus vs bacterial vs sarcoid?   Leukocytosis  Persistent cough  Asthma  Marijuana use   hypoalbuminemia  Body mass index is 23.56 kg/m².      RECOMMENDATIONS/PLAN:   AFB sputum x 3 negative.  Follow-up routine sputum culture-- normal yash  Paired blood cultures; negative  Consider HIV testing, patient declines testing  Bronchial hygiene with respiratory therapy techniques, Incentive spirometer; flutter device  F/u fungus, IgE, ACE, quantiferon  DVT prophylaxis  If discharged, would treat with 3 weeks of Augmentin and will require close followup and repeat imaging as an outpatient.      Interval history:  : feeling better. Less congestion. Noted CT scan   : 3 AFBs negative. Some studies still pending.         [x] High complexity decision making was performed  [x] See my orders for details      Initial HPI:      I was asked by Alex Morrow MD to see Jun Andujar  a 18 y.o.   male in consultation for a chief complaint of abnormal chest x-ray    Excerpts from admission 2024 or consult notes as follows:     \" Jun Andujar is a 18 y.o. male with past medical history of asthma presented as a direct admission/transfer from [facility] ED to [facility] with chief complaints of sore throat and cough.  Symptoms onset reported again about 2 weeks ago with cough productive of white sputum, and sore throat which is remain constant, now severe, without specific exacerbating leaving factors.  Patient was seen twice at [facility], had been prescribed steroids, inhalers, and Mucinex without

## 2024-06-28 NOTE — CARE COORDINATION
Transition of Care Plan:    RUR: 6%  Prior Level of Functioning: Independent  Disposition: Home with outpatient services  If SNF or IPR: Date FOC offered: N/A  Follow up appointments: PCP (new patient PCP appt scheduled and on AVS)    Follow up with [facility]  Tuesday Jul 9, 2024  New patient primary care appointment scheduled for 7/9 at 11:00 AM. Please arrive 15-30 min early and bring ID, insurance card, and list of current medications. Please call at least 48 hours in advance if you need to change or cancel this appointment. [address] [address]  [phone]   DME needed: None  Transportation at discharge: Family  IM/IMM Medicare/ letter given: N/A - patient has Medicaid   Is patient a [place] and connected with [facility]? N/A   If yes, was [place] transfer form completed and [facility] notified? N/A  Caregiver Contact: Self  Discharge Caregiver contacted prior to discharge? Yes  Care Conference needed? Not at this time  Barriers to discharge: Pulmonology clearance and final lab results     CM discussed pt's discharge plan with medical team in IDR at 9:30 AM. Anticipated discharge is today, 6/28. CM available if any discharge needs arise.    Lulu Wallace LMSW,   [phone]  
Care Consult (CM Consult) Discharge Planning   Services At/After Discharge Outpatient    Resource Information Provided? No   Mode of Transport at Discharge Other (see comment)  (Mother)   Confirm Follow Up Transport Self   Condition of Participation: Discharge Planning   The Plan for Transition of Care is related to the following treatment goals: Discharge plan: home with outpatient services. Family to transport.   The Patient and/or Patient Representative was provided with a Choice of Provider? Patient   The Patient and/Or Patient Representative agree with the Discharge Plan? Yes  (TBD)   Freedom of Choice list was provided with basic dialogue that supports the patient's individualized plan of care/goals, treatment preferences, and shares the quality data associated with the providers?  Yes  (verbal discussion as applicable)     Advance Care Planning     General Advance Care Planning (ACP) Conversation    Date of Conversation: 6/27/2024  Conducted with: Patient with Decision Making Capacity  Other persons present: None    Healthcare Decision Maker:   Primary Decision Maker: MalikTonia [facility] [phone]  Click here to complete Healthcare Decision Makers including selection of the Healthcare Decision Maker Relationship (ie \"Primary\").   Today we documented Decision Maker(s) consistent with Legal Next of Kin hierarchy.    Length of Voluntary ACP Conversation in minutes:  <16 minutes (Non-Billable)    Lulu Wallace LMSW,   [phone]

## 2024-06-28 NOTE — PROGRESS NOTES
MG/3ML SOLN nebulizer solution Take 3 mLs by nebulization every 4 hours as needed for Shortness of Breath or Wheezing 6/15/24 6/20/24  Gail Solis MD   predniSONE 10 MG (21) TBPK Use as directed 6/15/24   Gail Solis MD   ibuprofen (ADVIL;MOTRIN) 600 MG tablet Take 1 tablet by mouth 4 times daily as needed for Fever or Pain 6/15/24   Gail Solis MD   acetaminophen (TYLENOL) 500 MG tablet Take 2 tablets by mouth 3 times daily as needed for Pain or Fever 6/15/24 6/20/24  Gail Solis MD        History: includes:  No past medical history on file.   No past surgical history on file.   Social History     Tobacco Use    Smoking status: Not on file    Smokeless tobacco: Not on file   Substance Use Topics    Alcohol use: Not on file      No family history on file.      ROS:Pertinent items are noted in HPI.    Objective:     Vital Signs: Telemetry:    normal sinus rhythm Intake/Output:   /82   Pulse 82   Temp 97.9 °F (36.6 °C) (Oral)   Resp 20   Wt 60.3 kg (133 lb)   SpO2 100%   BMI 23.56 kg/m²     Temp (24hrs), Av.8 °F (36.6 °C), Min:97.7 °F (36.5 °C), Max:97.9 °F (36.6 °C)                  Wt Readings from Last 4 Encounters:   24 60.3 kg (133 lb)   24 51.9 kg (114 lb 6.7 oz)   06/15/24 56.7 kg (125 lb)   02/24/15 34.6 kg (76 lb 3.2 oz) (28 %, Z= -0.58)*     * Growth percentiles are based on CDC (Boys, 2-20 Years) data.       Patient Vitals for the past 96 hrs (Last 3 readings):   Weight   24 0600 60.3 kg (133 lb)   24 0611 60.4 kg (133 lb 3.2 oz)   24 0712 60 kg (132 lb 4.4 oz)         PF Readings from Last 3 Encounters:   No data found for PF      Intake/Output Summary (Last 24 hours) at 2024 1524  Last data filed at 2024 2200  Gross per 24 hour   Intake 600 ml   Output --   Net 600 ml         Last shift:      No intake/output data recorded.  Last 3 shifts: 1901 -  0700  In: 600 [P.O.:600]  Out: -        Physical Exam:     General:  WDWNAAM  There is an ill-defined 4 cm cavitary nodule in the right upper lobe. Electronically signed by MADELYN SALOMON      I personally reviewed laboratory testing, pulmonary imaging, radiology reports, and pulse oximetry data.        Please note that this dictation was completed with Storee, the computer voice recognition software.  Quite often unanticipated grammatical, syntax, homophones, and other interpretive errors are inadvertently transcribed by the computer software.  Please disregard these errors.  Please excuse any errors that have escaped final proofreading  ______________________________________________________________________      Thank you for allowing us to participate in the care of this patient.      This care involved high complexity medical decision making: I personally:  Reviewed the flowsheet and previous days notes  Reviewed and summarized records or history from previous days note or discussions with staff, family  High Risk Drug therapy requiring intensive monitoring for toxicity: eg steroids, antibiotics  Reviewed and/or ordered Clinical lab tests  Reviewed images and/or ordered Radiology tests  discussed my assessment/management with : Nursing, Family for coordination of care      EL Almaguer NP

## 2024-06-28 NOTE — PLAN OF CARE
Per MD order and cleared by Pulmonology, PT received AVS documentation. PT A&O x4 at time of discharge. Provided teaching & education on prescribed medication, follow-up appointments, and s/sx of infection. I gave PT time for questions and PT verbalized understanding. All lines have been removed (intact) prior to discharge. PT left hospital in a uber to home residence.

## 2024-06-29 LAB
C IMMITIS AB TITR SER CF: NEGATIVE
M TB IFN-G BLD-IMP: NEGATIVE
M TB IFN-G CD4+ T-CELLS BLD-ACNC: 0.09 IU/ML
M TBIFN-G CD4+ CD8+T-CELLS BLD-ACNC: 0.19 IU/ML
QUANTIFERON CRITERIA: NORMAL
QUANTIFERON MITOGEN VALUE: >10 IU/ML
QUANTIFERON NIL VALUE: 0.07 IU/ML

## 2024-06-30 LAB
B DERMAT AB TITR SER: NEGATIVE
GALACTOMANNAN AG SPEC IA-ACNC: 0.03 INDEX (ref 0–0.49)

## 2024-07-06 LAB — H CAPSUL AG SER IA-ACNC: NEGATIVE NG/ML

## 2024-07-09 LAB — IGE SERPL-ACNC: NORMAL IU/ML

## 2024-08-09 LAB
ACID FAST STN SPEC: NEGATIVE
MYCOBACTERIUM SPEC QL CULT: NEGATIVE
SPECIMEN PREPARATION: NORMAL
SPECIMEN SOURCE: NORMAL
SPECIMEN SOURCE: NORMAL

## 2024-10-15 ENCOUNTER — HOSPITAL ENCOUNTER (EMERGENCY)
Facility: HOSPITAL | Age: 19
Discharge: HOME | End: 2024-10-15
Attending: STUDENT IN AN ORGANIZED HEALTH CARE EDUCATION/TRAINING PROGRAM

## 2024-10-15 ENCOUNTER — APPOINTMENT (OUTPATIENT)
Facility: HOSPITAL | Age: 19
End: 2024-10-15

## 2024-10-15 VITALS
DIASTOLIC BLOOD PRESSURE: 92 MMHG | RESPIRATION RATE: 16 BRPM | OXYGEN SATURATION: 98 % | BODY MASS INDEX: 25.98 KG/M2 | HEIGHT: 63 IN | HEART RATE: 99 BPM | SYSTOLIC BLOOD PRESSURE: 142 MMHG | WEIGHT: 146.61 LBS | TEMPERATURE: 98.1 F

## 2024-10-15 DIAGNOSIS — J98.01 ACUTE BRONCHOSPASM: Primary | ICD-10-CM

## 2024-10-15 PROCEDURE — 6360000002 HC RX W HCPCS: Performed by: PHYSICIAN ASSISTANT

## 2024-10-15 PROCEDURE — 99283 EMERGENCY DEPT VISIT LOW MDM: CPT

## 2024-10-15 PROCEDURE — 71046 X-RAY EXAM CHEST 2 VIEWS: CPT

## 2024-10-15 RX ORDER — ALBUTEROL SULFATE 5 MG/ML
2.5 SOLUTION RESPIRATORY (INHALATION) EVERY 4 HOURS PRN
Qty: 120 EACH | Refills: 0 | Status: SHIPPED | OUTPATIENT
Start: 2024-10-15

## 2024-10-15 RX ORDER — DEXAMETHASONE 4 MG/1
10 TABLET ORAL ONCE
Status: COMPLETED | OUTPATIENT
Start: 2024-10-15 | End: 2024-10-15

## 2024-10-15 RX ADMIN — DEXAMETHASONE 10 MG: 4 TABLET ORAL at 03:50

## 2024-10-15 ASSESSMENT — ENCOUNTER SYMPTOMS
RHINORRHEA: 1
COUGH: 1

## 2024-10-15 ASSESSMENT — PAIN SCALES - GENERAL: PAINLEVEL_OUTOF10: 0

## 2024-10-15 ASSESSMENT — PAIN - FUNCTIONAL ASSESSMENT: PAIN_FUNCTIONAL_ASSESSMENT: NONE - DENIES PAIN

## 2024-10-15 NOTE — DISCHARGE INSTRUCTIONS
Return to the ER for new or worsening symptoms.  Use your inhaler or nebulizer every 4 hours as needed.  As we discussed, it may also be helpful to take an over-the-counter daily allergy medication for a couple of weeks such as Zyrtec, Claritin, Allegra.  Use your new spacer with your inhaler at home.

## 2024-10-15 NOTE — ED PROVIDER NOTES
0.5-2.5 (3) MG/3ML SOLN NEBULIZER SOLUTION    Take 3 mLs by nebulization every 4 hours as needed for Shortness of Breath or Wheezing       ALLERGIES     Patient has no known allergies.    FAMILY HISTORY     No family history on file.       SOCIAL HISTORY       Social History     Socioeconomic History    Marital status: Single           PHYSICAL EXAM    (up to 7 for level 4, 8 or more for level 5)     ED Triage Vitals [10/15/24 0308]   BP Systolic BP Percentile Diastolic BP Percentile Temp Temp Source Pulse Respirations SpO2   (!) 142/92 -- -- 98.1 °F (36.7 °C) Oral 99 16 98 %      Height Weight - Scale         1.6 m (5' 3\") 66.5 kg (146 lb 9.7 oz)             Body mass index is 25.97 kg/m².    Physical Exam  Vitals and nursing note reviewed.   Constitutional:       General: He is not in acute distress.     Appearance: Normal appearance. He is not ill-appearing.      Comments: Pleasant, well-appearing, no distress   HENT:      Head: Normocephalic and atraumatic.   Eyes:      General: No scleral icterus.  Cardiovascular:      Rate and Rhythm: Normal rate and regular rhythm.      Heart sounds: No murmur heard.  Pulmonary:      Effort: Pulmonary effort is normal. No respiratory distress.      Breath sounds: Wheezing present.      Comments: End expiratory wheeze only noted with forced expiration  Abdominal:      Palpations: Abdomen is soft.      Tenderness: There is no abdominal tenderness.   Musculoskeletal:         General: Normal range of motion.      Cervical back: Normal range of motion.   Skin:     General: Skin is warm and dry.   Neurological:      Mental Status: He is alert. Mental status is at baseline.   Psychiatric:         Mood and Affect: Mood normal.         DIAGNOSTIC RESULTS     EKG: All EKG's are interpreted by the Emergency Department Physician who either signs or Co-signs this chart in the absence of a cardiologist.        RADIOLOGY:   Non-plain film images such as CT, Ultrasound and MRI are read by

## 2024-10-15 NOTE — ED NOTES
Patient given discharge instructions and spacer to take home. No questions or concerns at this time. Patient vital signs stable and in no acute distress. Patient ambulatory at discharge.

## 2024-10-15 NOTE — ED TRIAGE NOTES
Patient arrives ambulatory with c/o asthma flare x3 days. Has not used rescue medications at home, prescription ran out. Endorses cough and SOB on exertion

## 2024-10-20 ENCOUNTER — HOSPITAL ENCOUNTER (EMERGENCY)
Facility: HOSPITAL | Age: 19
Discharge: HOME | End: 2024-10-20
Attending: EMERGENCY MEDICINE
Payer: MEDICAID

## 2024-10-20 VITALS
WEIGHT: 140 LBS | BODY MASS INDEX: 23.9 KG/M2 | HEIGHT: 64 IN | RESPIRATION RATE: 20 BRPM | DIASTOLIC BLOOD PRESSURE: 65 MMHG | OXYGEN SATURATION: 96 % | SYSTOLIC BLOOD PRESSURE: 119 MMHG | HEART RATE: 100 BPM | TEMPERATURE: 99 F

## 2024-10-20 DIAGNOSIS — J03.90 ACUTE TONSILLITIS, UNSPECIFIED ETIOLOGY: Primary | ICD-10-CM

## 2024-10-20 LAB
FLUAV RNA SPEC QL NAA+PROBE: NOT DETECTED
FLUBV RNA SPEC QL NAA+PROBE: NOT DETECTED
S PYO DNA THROAT QL NAA+PROBE: NOT DETECTED
SARS-COV-2 RNA RESP QL NAA+PROBE: NOT DETECTED
SOURCE: NORMAL

## 2024-10-20 PROCEDURE — 87636 SARSCOV2 & INF A&B AMP PRB: CPT

## 2024-10-20 PROCEDURE — 6360000002 HC RX W HCPCS: Performed by: EMERGENCY MEDICINE

## 2024-10-20 PROCEDURE — 87651 STREP A DNA AMP PROBE: CPT

## 2024-10-20 PROCEDURE — 6370000000 HC RX 637 (ALT 250 FOR IP): Performed by: EMERGENCY MEDICINE

## 2024-10-20 PROCEDURE — 99283 EMERGENCY DEPT VISIT LOW MDM: CPT

## 2024-10-20 RX ORDER — IBUPROFEN 600 MG/1
600 TABLET, FILM COATED ORAL EVERY 8 HOURS PRN
Qty: 30 TABLET | Refills: 0 | Status: SHIPPED | OUTPATIENT
Start: 2024-10-20

## 2024-10-20 RX ORDER — AMOXICILLIN 500 MG/1
500 CAPSULE ORAL 2 TIMES DAILY
Qty: 20 CAPSULE | Refills: 0 | Status: SHIPPED | OUTPATIENT
Start: 2024-10-20 | End: 2024-10-30

## 2024-10-20 RX ORDER — IBUPROFEN 600 MG/1
600 TABLET, FILM COATED ORAL
Status: COMPLETED | OUTPATIENT
Start: 2024-10-20 | End: 2024-10-20

## 2024-10-20 RX ORDER — DEXAMETHASONE SODIUM PHOSPHATE 10 MG/ML
10 INJECTION, SOLUTION INTRAMUSCULAR; INTRAVENOUS ONCE
Status: COMPLETED | OUTPATIENT
Start: 2024-10-20 | End: 2024-10-20

## 2024-10-20 RX ADMIN — IBUPROFEN 600 MG: 600 TABLET, FILM COATED ORAL at 16:50

## 2024-10-20 RX ADMIN — DEXAMETHASONE SODIUM PHOSPHATE 10 MG: 10 INJECTION INTRAMUSCULAR; INTRAVENOUS at 16:50

## 2024-10-20 ASSESSMENT — PAIN - FUNCTIONAL ASSESSMENT: PAIN_FUNCTIONAL_ASSESSMENT: NONE - DENIES PAIN

## 2024-10-20 NOTE — ED NOTES
Patient given copy of dc instructions and 2 script(s). Patient verbalized their understanding of instructions and script(s). Patient given a current medication reconciliation form and verbalized understanding of their medications. Patient verbalized understanding of the importance of discussing medications with his or her physician or clinic they will be following up with. Patient alert and oriented and in no acute distress. Patient discharged from the ER, patient offered a wheelchair, and when offered patient declines wheelchair.

## 2024-10-20 NOTE — ED TRIAGE NOTES
Pt to er for c/o upper respiratory symptoms x 3 days.      Visit Information Date & Time Provider Department Dept. Phone Encounter #  
 8/1/2017  9:15 AM Kalpana Zamudio MD [address] [facility], [place] OFFICE-ANNEX [phone] [id number] Follow-up Instructions Return in about 2 weeks (around 8/15/2017) for follow up ear infection. Upcoming Health Maintenance Date Due Hib Peds Age 0-5 (4 of 4 - Standard Series) 10/3/2013 MMR Peds Age 1-18 (2 of 2) 11/1/2016 INFLUENZA PEDS 6M-8Y (1 of 2) 8/1/2017 MCV through Age 25 (1 of 2) 10/3/2023 DTaP/Tdap/Td series (6 - Tdap) 10/3/2023 Allergies as of 8/1/2017  Review Complete On: 8/1/2017 By: Sammie Arreguin LPN No Known Allergies Current Immunizations  Reviewed on 7/31/2017 Name Date DTaP 4/2/2014, 4/9/2013, 2/5/2013, 2012 DTaP-IPV 10/4/2016 11:17 AM  
 Hep A Vaccine 10/2/2014, 10/3/2013 Hep B Vaccine 7/3/2013, 2012, 2012 Hib 4/9/2013, 2/5/2013, 2012 Influenza Vaccine (Quad) PF 10/4/2016 11:20 AM  
 MMRV 10/4/2016 11:19 AM  
 Pneumococcal Conjugate (PCV-13) 10/3/2013, 4/9/2013, 2/5/2013, 2012 Poliovirus vaccine 2/5/2013, 2012 Rotavirus Vaccine 4/9/2013, 2/5/2013, 2012 Varicella Virus Vaccine 10/3/2013 Not reviewed this visit You Were Diagnosed With   
  
 Codes Comments Bacterial ear infection, bilateral    -  Primary ICD-10-CM: H66.93, B96.89 
ICD-9-CM: 382.9, 041.9 Follow-up exam     ICD-10-CM: A58 ICD-9-CM: V67.9 Vitals Pulse Temp Resp Weight(growth percentile) Smoking Status 90 97.6 °F (36.4 °C) (Axillary) 24 43 lb (19.5 kg) (73 %, Z= 0.60)* Never Smoker *Growth percentiles are based on [facility] 2-20 Years data. Vitals History Preferred Pharmacy Pharmacy Name Phone [id number] [place], [address] AT [facility] [phone] Your Updated Medication List  
  
   
 This list is accurate as of: 8/1/17 10:13 AM.  Always use your most recent med list.  
  
  
  
  
 amoxicillin-clavulanate 600-42.9 mg/5 mL suspension Commonly known as:  AUGMENTIN Take 7.5 mL by mouth two (2) times a day for 10 days. cetirizine 1 mg/mL solution Commonly known as:  ZYRTEC Take 5 mL by mouth daily. cloNIDine HCl 0.1 mg tablet Commonly known as:  CATAPRES Take 0.5 Tabs by mouth nightly. fluticasone 50 mcg/actuation nasal spray Commonly known as:  FLONASE  
1 [place] by Nasal route daily. Indications: ALLERGIC RHINITIS  
  
 guanFACINE IR 1 mg IR tablet Commonly known as:  Azell Horseman Take 0.5 Tabs by mouth daily. ibuprofen 100 mg/5 mL suspension Commonly known as:  ADVIL;MOTRIN Take 8.1 mL by mouth every six (6) hours as needed. Lactobacillus acidophilus Powd Take 1 Packet by mouth daily. pediatric multivitamin-iron solution Commonly known as:  POLY-VI-SOL with IRON Take 1 mL by mouth daily. polyethylene glycol 17 gram packet Commonly known as:  Henry Barrs Take 0.5 Packets by mouth daily. Prescriptions Sent to Pharmacy Refills  
 amoxicillin-clavulanate (AUGMENTIN) 600-42.9 mg/5 mL suspension 0 Sig: Take 7.5 mL by mouth two (2) times a day for 10 days. Class: Normal  
 Pharmacy: [address] [place] [address] Ph #: [phone] Route: Oral  
 Lactobacillus acidophilus powd 0 Sig: Take 1 Packet by mouth daily. Class: Normal  
 Pharmacy: [address] [place] [address] Ph #: [phone] Route: Oral  
  
Follow-up Instructions Return in about 2 weeks (around 8/15/2017) for follow up ear infection. Introducing [facility]! Dear Parent or Guardian, Thank you for requesting a [employer] account for your child.   With [employer] you can view your childs hospital or ER discharge instructions, current allergies, immunizations and much more. In order to access your childs information, we require a signed consent on file. Please see the [facility] department or call [phone] for instructions on completing a [employer] Proxy request.   
Additional Information If you have questions, please visit the Frequently Asked Questions section of the [employer] website at [email or web address] Remember, [employer] is NOT to be used for urgent needs. For medical emergencies, dial 911. Now available from your iPhone and Android! Please provide this summary of care documentation to your next provider. Your primary care clinician is listed as FLO PERZE. If you have any questions after today's visit, please call [phone].

## 2024-10-20 NOTE — ED NOTES
Patient here with c/o cold symptoms.  Patient states symptoms for 2-3 days, worsening yesterday evening.  Patient denies fevers.  Patient with sore throat, patient's tonsils very red and swollen on assessment.      Emergency Department Nursing Plan of Care       The Nursing Plan of Care is developed from the Nursing assessment and Emergency Department Attending provider initial evaluation.  The plan of care may be reviewed in the “ED Provider note”.      The Plan of Care was developed with the following considerations:  Patient / Family readiness to learn indicated by:verbalized understanding  Persons(s) to be included in education: patient  Barriers to Learning/Limitations:None      Signed     Gloria Quispe RN    10/20/2024   4:55 PM

## 2024-10-20 NOTE — ED PROVIDER NOTES
[facility] EMERGENCY DEPT  EMERGENCY DEPARTMENT ENCOUNTER       Pt Name: Jun Andujar  MRN: [id number]  Birthdate 2005  Date of evaluation: 10/20/2024  Provider: Gisela Oshea MD   PCP: No primary care provider on file.  Note Started: 4:24 PM EDT 10/20/24     CHIEF COMPLAINT       Chief Complaint   Patient presents with    URI        HISTORY OF PRESENT ILLNESS: 1 or more elements      History From: patient, History limited by: none     Jun Andujar is a 19 y.o. male who presents with a cc of sore throat and runny nose       Please See MDM for Additional Details of the HPI/PMH  Nursing Notes were all reviewed and agreed with or any disagreements were addressed in the HPI.     REVIEW OF SYSTEMS        Positives and Pertinent negatives as per HPI.    PAST HISTORY     Past Medical History:  No past medical history on file.    Past Surgical History:  No past surgical history on file.    Family History:  No family history on file.    Social History:       Allergies:  No Known Allergies    CURRENT MEDICATIONS      Previous Medications    ACETAMINOPHEN (TYLENOL) 500 MG TABLET    Take 2 tablets by mouth 3 times daily as needed for Pain or Fever    ALBUTEROL (PROVENTIL) (5 MG/ML) 0.5% NEBULIZER SOLUTION    Take 0.5 mLs by nebulization every 4 hours as needed for Wheezing    IBUPROFEN (ADVIL;MOTRIN) 600 MG TABLET    Take 1 tablet by mouth 4 times daily as needed for Fever or Pain    IPRATROPIUM 0.5 MG-ALBUTEROL 2.5 MG (DUONEB) 0.5-2.5 (3) MG/3ML SOLN NEBULIZER SOLUTION    Take 3 mLs by nebulization every 4 hours as needed for Shortness of Breath or Wheezing       SCREENINGS               No data recorded         PHYSICAL EXAM      ED Triage Vitals [10/20/24 1528]   Encounter Vitals Group      /65      Systolic BP Percentile       Diastolic BP Percentile       Pulse 100      Respirations 20      Temp 99 °F (37.2 °C)      Temp src       SpO2 96 %      Weight - Scale 63.5 kg (140 lb)      Height 1.626 m (5' 4\")      Head

## 2024-11-11 ENCOUNTER — HOSPITAL ENCOUNTER (EMERGENCY)
Facility: HOSPITAL | Age: 19
Discharge: HOME | End: 2024-11-11
Attending: EMERGENCY MEDICINE
Payer: MEDICAID

## 2024-11-11 ENCOUNTER — APPOINTMENT (OUTPATIENT)
Facility: HOSPITAL | Age: 19
End: 2024-11-11
Payer: MEDICAID

## 2024-11-11 VITALS
SYSTOLIC BLOOD PRESSURE: 131 MMHG | DIASTOLIC BLOOD PRESSURE: 63 MMHG | OXYGEN SATURATION: 98 % | HEART RATE: 81 BPM | RESPIRATION RATE: 18 BRPM | WEIGHT: 145 LBS | HEIGHT: 64 IN | TEMPERATURE: 97.8 F | BODY MASS INDEX: 24.75 KG/M2

## 2024-11-11 DIAGNOSIS — J45.901 ASTHMA WITH ACUTE EXACERBATION, UNSPECIFIED ASTHMA SEVERITY, UNSPECIFIED WHETHER PERSISTENT: Primary | ICD-10-CM

## 2024-11-11 PROCEDURE — 71045 X-RAY EXAM CHEST 1 VIEW: CPT

## 2024-11-11 PROCEDURE — 6370000000 HC RX 637 (ALT 250 FOR IP): Performed by: EMERGENCY MEDICINE

## 2024-11-11 PROCEDURE — 6360000002 HC RX W HCPCS: Performed by: EMERGENCY MEDICINE

## 2024-11-11 PROCEDURE — 94640 AIRWAY INHALATION TREATMENT: CPT

## 2024-11-11 PROCEDURE — 99283 EMERGENCY DEPT VISIT LOW MDM: CPT

## 2024-11-11 RX ORDER — IPRATROPIUM BROMIDE AND ALBUTEROL SULFATE 2.5; .5 MG/3ML; MG/3ML
1 SOLUTION RESPIRATORY (INHALATION)
Status: COMPLETED | OUTPATIENT
Start: 2024-11-11 | End: 2024-11-11

## 2024-11-11 RX ORDER — BUDESONIDE AND FORMOTEROL FUMARATE DIHYDRATE 80; 4.5 UG/1; UG/1
2 AEROSOL RESPIRATORY (INHALATION) 2 TIMES DAILY
Qty: 10.2 G | Refills: 3 | Status: SHIPPED | OUTPATIENT
Start: 2024-11-11

## 2024-11-11 RX ORDER — BENZONATATE 100 MG/1
100 CAPSULE ORAL
Status: COMPLETED | OUTPATIENT
Start: 2024-11-11 | End: 2024-11-11

## 2024-11-11 RX ORDER — DEXAMETHASONE 4 MG/1
10 TABLET ORAL ONCE
Status: COMPLETED | OUTPATIENT
Start: 2024-11-11 | End: 2024-11-11

## 2024-11-11 RX ORDER — PREDNISONE 20 MG/1
40 TABLET ORAL DAILY
Qty: 10 TABLET | Refills: 0 | Status: SHIPPED | OUTPATIENT
Start: 2024-11-11 | End: 2024-11-16

## 2024-11-11 RX ADMIN — BENZONATATE 100 MG: 100 CAPSULE ORAL at 11:21

## 2024-11-11 RX ADMIN — IPRATROPIUM BROMIDE AND ALBUTEROL SULFATE 1 DOSE: .5; 3 SOLUTION RESPIRATORY (INHALATION) at 11:25

## 2024-11-11 RX ADMIN — DEXAMETHASONE 10 MG: 4 TABLET ORAL at 11:21

## 2024-11-11 ASSESSMENT — PAIN - FUNCTIONAL ASSESSMENT: PAIN_FUNCTIONAL_ASSESSMENT: NONE - DENIES PAIN

## 2024-11-11 NOTE — DISCHARGE INSTRUCTIONS
Thank You!    It was a pleasure taking care of you in our Emergency Department today. We know that when you come to our Emergency Department, you are entrusting us with your health, comfort, and safety. Our physicians and nurses honor that trust, and truly appreciate the opportunity to care for you and your loved ones.      We also value your feedback. If you receive a survey about your Emergency Department experience today, please fill it out.  We care about our patients' feedback, and we listen to what you have to say.  Thank you.    Anurag Wilks MD  ________________________________________________________________________  I have included a copy of your lab results and/or radiologic studies from today's visit so you can have them easily available at your follow-up visit. We hope you feel better and please do not hesitate to contact the ED if you have any questions at all!    No results found for this or any previous visit (from the past 12 hour(s)).  XR CHEST PORTABLE   Final Result      No acute process on portable chest.         Electronically signed by JAMES FAY          The exam and treatment you received in the Emergency Department were for an urgent problem and are not intended as complete care. It is important that you follow up with a doctor, nurse practitioner, or physician assistant for ongoing care. If your symptoms become worse or you do not improve as expected and you are unable to reach your usual health care provider, you should return to the Emergency Department. We are available 24 hours a day.    Please take your discharge instructions with you when you go to your follow-up appointment.     If a prescription has been provided, please have it filled as soon as possible to prevent a delay in treatment. Read the entire medication instruction sheet provided to you by the pharmacy. If you have any questions or reservations about taking the medication due to side effects or interactions with other

## 2024-11-11 NOTE — ED NOTES
Pt presents ambulatory to ED complaining of productive cough and intermittent SOB with exertion. Pt has hx of asthma, he denies using his inhaler or nebulizer treatment. He denies chest pain. Pt is speaking in full sentences without difficulty. Pt denies blood in his sputum. Pt is alert and oriented x 4, RR even and unlabored, skin is warm and dry. Assessment completed and pt updated on plan of care.        Emergency Department Nursing Plan of Care        The Nursing Plan of Care is developed from the Nursing assessment and Emergency Department Attending provider initial evaluation.  The plan of care may be reviewed in the “ED Provider note”.

## 2024-11-11 NOTE — ED NOTES
Discharge instructions were given to the patient by Radha Avelar RN  .     The patient left the Emergency Department ambulatory, alert and oriented and in no acute distress with 2 prescriptions. The patient was encouraged to call or return to the ED for worsening issues or problems and was encouraged to schedule a follow up appointment for continuing care. Patient discharged home ambulatory with a steady gait.    The patient verbalized understanding of discharge instructions and prescriptions, all questions were answered. The patient has no further concerns at this time.

## 2025-04-28 ENCOUNTER — HOSPITAL ENCOUNTER (EMERGENCY)
Facility: HOSPITAL | Age: 20
Discharge: HOME | End: 2025-04-28
Attending: EMERGENCY MEDICINE

## 2025-04-28 VITALS
TEMPERATURE: 98.8 F | DIASTOLIC BLOOD PRESSURE: 60 MMHG | HEART RATE: 93 BPM | SYSTOLIC BLOOD PRESSURE: 156 MMHG | BODY MASS INDEX: 24.75 KG/M2 | HEIGHT: 64 IN | OXYGEN SATURATION: 97 % | WEIGHT: 145 LBS | RESPIRATION RATE: 16 BRPM

## 2025-04-28 DIAGNOSIS — J02.9 ACUTE PHARYNGITIS, UNSPECIFIED ETIOLOGY: Primary | ICD-10-CM

## 2025-04-28 DIAGNOSIS — J22 ACUTE RESPIRATORY INFECTION: ICD-10-CM

## 2025-04-28 DIAGNOSIS — J01.90 ACUTE NON-RECURRENT SINUSITIS, UNSPECIFIED LOCATION: ICD-10-CM

## 2025-04-28 DIAGNOSIS — J30.89 NON-SEASONAL ALLERGIC RHINITIS, UNSPECIFIED TRIGGER: ICD-10-CM

## 2025-04-28 PROCEDURE — 6360000002 HC RX W HCPCS: Performed by: EMERGENCY MEDICINE

## 2025-04-28 PROCEDURE — 87636 SARSCOV2 & INF A&B AMP PRB: CPT

## 2025-04-28 PROCEDURE — 87651 STREP A DNA AMP PROBE: CPT

## 2025-04-28 PROCEDURE — 99283 EMERGENCY DEPT VISIT LOW MDM: CPT

## 2025-04-28 PROCEDURE — 6370000000 HC RX 637 (ALT 250 FOR IP): Performed by: EMERGENCY MEDICINE

## 2025-04-28 RX ORDER — GUAIFENESIN 600 MG/1
600 TABLET, EXTENDED RELEASE ORAL
Status: CANCELLED | OUTPATIENT
Start: 2025-04-28 | End: 2025-04-28

## 2025-04-28 RX ORDER — DEXTROMETHORPHAN HBR AND GUAIFENESIN 5; 100 MG/5ML; MG/5ML
5 LIQUID ORAL EVERY 4 HOURS PRN
Qty: 237 ML | Refills: 0 | Status: SHIPPED | OUTPATIENT
Start: 2025-04-28 | End: 2025-05-08

## 2025-04-28 RX ORDER — ALBUTEROL SULFATE 90 UG/1
2 INHALANT RESPIRATORY (INHALATION) ONCE
Status: CANCELLED | OUTPATIENT
Start: 2025-04-28

## 2025-04-28 RX ORDER — DEXAMETHASONE SODIUM PHOSPHATE 10 MG/ML
10 INJECTION, SOLUTION INTRAMUSCULAR; INTRAVENOUS ONCE
Status: COMPLETED | OUTPATIENT
Start: 2025-04-28 | End: 2025-04-28

## 2025-04-28 RX ORDER — CETIRIZINE HYDROCHLORIDE 10 MG/1
10 TABLET ORAL DAILY
Qty: 30 TABLET | Refills: 0 | Status: SHIPPED | OUTPATIENT
Start: 2025-04-28

## 2025-04-28 RX ORDER — LIDOCAINE HYDROCHLORIDE 20 MG/ML
15 SOLUTION OROPHARYNGEAL
Status: COMPLETED | OUTPATIENT
Start: 2025-04-28 | End: 2025-04-28

## 2025-04-28 RX ORDER — PREDNISONE 50 MG/1
50 TABLET ORAL DAILY
Qty: 5 TABLET | Refills: 0 | Status: SHIPPED | OUTPATIENT
Start: 2025-04-28 | End: 2025-05-03

## 2025-04-28 RX ORDER — CETIRIZINE HYDROCHLORIDE 10 MG/1
10 TABLET ORAL ONCE
Status: COMPLETED | OUTPATIENT
Start: 2025-04-28 | End: 2025-04-28

## 2025-04-28 RX ADMIN — LIDOCAINE HYDROCHLORIDE 15 ML: 20 SOLUTION ORAL at 06:12

## 2025-04-28 RX ADMIN — CETIRIZINE HYDROCHLORIDE 10 MG: 10 TABLET, FILM COATED ORAL at 06:10

## 2025-04-28 RX ADMIN — DEXAMETHASONE SODIUM PHOSPHATE 10 MG: 10 INJECTION, SOLUTION INTRAMUSCULAR; INTRAVENOUS at 06:11

## 2025-04-28 ASSESSMENT — PAIN SCALES - GENERAL: PAINLEVEL_OUTOF10: 7

## 2025-04-28 ASSESSMENT — PAIN DESCRIPTION - DESCRIPTORS: DESCRIPTORS: SORE

## 2025-04-28 ASSESSMENT — ENCOUNTER SYMPTOMS
RHINORRHEA: 1
ABDOMINAL PAIN: 0
VOMITING: 0
SHORTNESS OF BREATH: 0
NAUSEA: 0
COUGH: 1
SORE THROAT: 1
EYE PAIN: 0
DIARRHEA: 0

## 2025-04-28 ASSESSMENT — PAIN DESCRIPTION - LOCATION: LOCATION: THROAT

## 2025-04-28 ASSESSMENT — PAIN - FUNCTIONAL ASSESSMENT: PAIN_FUNCTIONAL_ASSESSMENT: 0-10

## 2025-04-28 NOTE — ED TRIAGE NOTES
Pt presents to ED with c/o sore throat & runny nose for 3 days. Pt reports taking Benadryl yesterday with no relief.

## 2025-04-28 NOTE — ED NOTES
See triage note. Pt is alert and oriented x 4, RR even and unlabored, skin is warm and dry. Assesment completed and pt updated on plan of care.       Emergency Department Nursing Plan of Care       The Nursing Plan of Care is developed from the Nursing assessment and Emergency Department Attending provider initial evaluation.  The plan of care may be reviewed in the “ED Provider note”.    The Plan of Care was developed with the following considerations:   Patient / Family readiness to learn indicated by:verbalized understanding  Persons(s) to be included in education: patient  Barriers to Learning/Limitations:None    Signed     Varsha Gamez RN    4/28/2025   6:02 AM

## 2025-04-28 NOTE — ED PROVIDER NOTES
for any past records that may contribute to the patient's current condition, including their past medical, surgical, social and family history. This also includes their most recent ED visits, previous hospitalizations and prior diagnostic data. I have reviewed and summarized the most pertinent findings in my HPI and MDM.    Vital Signs Reviewed:  Patient Vitals for the past 24 hrs:   Temp Pulse Resp BP SpO2   04/28/25 0555 98.8 °F (37.1 °C) 93 16 (!) 156/60 97 %     Pulse Oximetry Analysis: 97% on RA with good pleth    Cardiac Monitor:   Rate: 93 bpm  The cardiac monitor revealed the following rhythm as interpreted by me: Normal Sinus Rhythm  Cardiac and pulse ox monitoring were ordered to monitor patient for signs of cardiac dysrhythmia, which they are at risk for based on their history and/or risk for cardiovascular disease and/or metabolic abnormalities.     Records Reviewed: Nursing Notes, Old Medical Records, Previous electrocardiograms, Previous Radiology Studies and Previous Laboratory Studies, EMS reports    DIFFERENTIAL DIAGNOSIS AND PLAN:  Pt presents with acute URI symptoms including nasal congestion, rhinorrhea and sore throat. Pt also has c/o of cough without dyspnea, chest pain or wheezing. Pt is well-appearing with stable vitals and an otherwise benign exam; symptoms are consistent with an uncomplicated URI.  DDx: COVID-19, acute bronchitis, bacterial sinusitis vs. pharyngitis, migraine, flu.     I considered bloodwork (CBC, CMP, Mg), but no bloodwork indicated as I considered but do not suspect electrolyte abnl such as hyponatremia/hypernatremia/hypokalemia/hyperkalemia/hyperglycemia, hypomagnesemia or severe anemia.      I considered CXR, but no CXR indicated as I considered but do not suspect reactive airway pulmonary changes or infiltrates, etc.     I considered ECG/troponin/BNP, but no ECG/troponin/BNP indicated as I considered but do not suspect ACS/CHF/AMI/myocarditis.     Symptomatic therapy

## 2025-04-28 NOTE — ED NOTES
MD reviewed discharge instructions and options with patient and patient verbalized understanding. RN reviewed discharge instructions using teachback method. He was counseled on 3 prescriptions. Pt ambulated to exit without difficulty and in no signs of acute distress escorted by female , and she  will drive home. No complaints or needs expressed at this time. Patient was counseled on medications prescribed at discharge. VSS, verbalized relief from most intense pain. Patient to call his PCP in the morning for appointment.